# Patient Record
Sex: MALE | Race: WHITE | NOT HISPANIC OR LATINO | Employment: FULL TIME | URBAN - METROPOLITAN AREA
[De-identification: names, ages, dates, MRNs, and addresses within clinical notes are randomized per-mention and may not be internally consistent; named-entity substitution may affect disease eponyms.]

---

## 2017-02-15 ENCOUNTER — HISTORICAL (OUTPATIENT)
Dept: LAB | Facility: HOSPITAL | Age: 47
End: 2017-02-15

## 2018-02-14 ENCOUNTER — HISTORICAL (OUTPATIENT)
Dept: LAB | Facility: HOSPITAL | Age: 48
End: 2018-02-14

## 2018-02-14 LAB
ALBUMIN SERPL-MCNC: 3.9 GM/DL (ref 3.4–5)
ALP SERPL-CCNC: 73 UNIT/L (ref 46–116)
ALT SERPL-CCNC: 30 UNIT/L (ref 12–78)
AST SERPL-CCNC: 21 UNIT/L (ref 15–37)
BILIRUB SERPL-MCNC: 0.8 MG/DL (ref 0.2–1)
BILIRUBIN DIRECT+TOT PNL SERPL-MCNC: 0.2 MG/DL (ref 0–0.2)
BILIRUBIN DIRECT+TOT PNL SERPL-MCNC: 0.58 MG/DL (ref 0–0.8)
BUN SERPL-MCNC: 26.2 MG/DL (ref 7–18)
CALCIUM SERPL-MCNC: 10 MG/DL (ref 8.5–10.1)
CHLORIDE SERPL-SCNC: 105 MMOL/L (ref 98–107)
CHOLEST SERPL-MCNC: 146 MG/DL (ref 0–200)
CHOLEST/HDLC SERPL: 3 {RATIO} (ref 0–5)
CO2 SERPL-SCNC: 31 MMOL/L (ref 21–32)
CREAT SERPL-MCNC: 1.82 MG/DL (ref 0.6–1.3)
ERYTHROCYTE [DISTWIDTH] IN BLOOD BY AUTOMATED COUNT: 13.5 % (ref 11.5–17)
EST. AVERAGE GLUCOSE BLD GHB EST-MCNC: 114 MG/DL
FT4I SERPL CALC-MCNC: 2.26
GLUCOSE SERPL-MCNC: 111 MG/DL (ref 74–106)
HBA1C MFR BLD: 5.6 % (ref 4.5–6.2)
HCT VFR BLD AUTO: 47.3 % (ref 42–52)
HDLC SERPL-MCNC: 48 MG/DL (ref 40–60)
HGB BLD-MCNC: 15.9 GM/DL (ref 14–18)
LDLC SERPL CALC-MCNC: 67 MG/DL (ref 0–129)
MCH RBC QN AUTO: 32.8 PG (ref 27–31)
MCHC RBC AUTO-ENTMCNC: 33.7 GM/DL (ref 33–36)
MCV RBC AUTO: 97.3 FL (ref 80–94)
PLATELET # BLD AUTO: 375 X10(3)/MCL (ref 130–400)
PMV BLD AUTO: 8.3 FL (ref 7.4–10.4)
POTASSIUM SERPL-SCNC: 5.3 MMOL/L (ref 3.5–5.1)
PROT SERPL-MCNC: 7.5 GM/DL (ref 6.4–8.2)
PSA SERPL-MCNC: 0.63 NG/ML (ref 0–4)
RBC # BLD AUTO: 4.86 X10(6)/MCL (ref 4.7–6.1)
SODIUM SERPL-SCNC: 143 MMOL/L (ref 136–145)
T3RU NFR SERPL: 37 % (ref 31–39)
T4 SERPL-MCNC: 6.1 MCG/DL (ref 4.7–13.3)
TRIGL SERPL-MCNC: 157 MG/DL
TSH SERPL-ACNC: 2.58 MIU/ML (ref 0.36–3.74)
VLDLC SERPL CALC-MCNC: 31 MG/DL
WBC # SPEC AUTO: 7.8 X10(3)/MCL (ref 4.5–11.5)

## 2020-01-14 ENCOUNTER — HISTORICAL (OUTPATIENT)
Dept: LAB | Facility: HOSPITAL | Age: 50
End: 2020-01-14

## 2020-01-14 LAB
ABS NEUT (OLG): 4.21 X10(3)/MCL (ref 2.1–9.2)
ALBUMIN SERPL-MCNC: 4 GM/DL (ref 3.4–5)
ALBUMIN/GLOB SERPL: 1 RATIO (ref 1.1–2)
ALP SERPL-CCNC: 112 UNIT/L (ref 46–116)
ALT SERPL-CCNC: 30 UNIT/L (ref 12–78)
AST SERPL-CCNC: 27 UNIT/L (ref 15–37)
BASOPHILS # BLD AUTO: 0.1 X10(3)/MCL (ref 0–0.2)
BASOPHILS NFR BLD AUTO: 1 %
BILIRUB SERPL-MCNC: 0.5 MG/DL (ref 0.2–1)
BILIRUBIN DIRECT+TOT PNL SERPL-MCNC: 0.15 MG/DL (ref 0–0.2)
BILIRUBIN DIRECT+TOT PNL SERPL-MCNC: 0.35 MG/DL (ref 0–0.8)
BUN SERPL-MCNC: 25.1 MG/DL (ref 7–18)
CALCIUM SERPL-MCNC: 9.8 MG/DL (ref 8.5–10.1)
CHLORIDE SERPL-SCNC: 104 MMOL/L (ref 98–107)
CHOLEST SERPL-MCNC: 176 MG/DL (ref 0–200)
CHOLEST/HDLC SERPL: 4.1 {RATIO} (ref 0–5)
CO2 SERPL-SCNC: 30.8 MMOL/L (ref 21–32)
CREAT SERPL-MCNC: 1.81 MG/DL (ref 0.6–1.3)
EOSINOPHIL # BLD AUTO: 0.5 X10(3)/MCL (ref 0–0.9)
EOSINOPHIL NFR BLD AUTO: 7 %
ERYTHROCYTE [DISTWIDTH] IN BLOOD BY AUTOMATED COUNT: 14 % (ref 11.5–17)
EST. AVERAGE GLUCOSE BLD GHB EST-MCNC: 120 MG/DL
GLOBULIN SER-MCNC: 3.9 GM/DL (ref 2.4–3.5)
GLUCOSE SERPL-MCNC: 110 MG/DL (ref 74–106)
HBA1C MFR BLD: 5.8 % (ref 4.5–6.2)
HCT VFR BLD AUTO: 48.3 % (ref 42–52)
HDLC SERPL-MCNC: 43 MG/DL (ref 40–60)
HGB BLD-MCNC: 15.6 GM/DL (ref 14–18)
IMM GRANULOCYTES # BLD AUTO: 0.02 % (ref 0–0.02)
IMM GRANULOCYTES NFR BLD AUTO: 0.3 % (ref 0–0.43)
LDLC SERPL CALC-MCNC: 97 MG/DL (ref 0–129)
LYMPHOCYTES # BLD AUTO: 2 X10(3)/MCL (ref 0.6–4.6)
LYMPHOCYTES NFR BLD AUTO: 26 %
MCH RBC QN AUTO: 31.3 PG (ref 27–31)
MCHC RBC AUTO-ENTMCNC: 32.3 GM/DL (ref 33–36)
MCV RBC AUTO: 96.8 FL (ref 80–94)
MONOCYTES # BLD AUTO: 1 X10(3)/MCL (ref 0.1–1.3)
MONOCYTES NFR BLD AUTO: 13 %
NEUTROPHILS # BLD AUTO: 4.21 X10(3)/MCL (ref 1.4–7.9)
NEUTROPHILS NFR BLD AUTO: 54 %
PLATELET # BLD AUTO: 450 X10(3)/MCL (ref 130–400)
PMV BLD AUTO: 9.3 FL (ref 9.4–12.4)
POTASSIUM SERPL-SCNC: 5.5 MMOL/L (ref 3.5–5.1)
PROT SERPL-MCNC: 7.9 GM/DL (ref 6.4–8.2)
PSA SERPL-MCNC: 0.4 NG/ML (ref 0–4)
RBC # BLD AUTO: 4.99 X10(6)/MCL (ref 4.7–6.1)
SODIUM SERPL-SCNC: 143 MMOL/L (ref 136–145)
TESTOST SERPL-MCNC: 183 NG/DL (ref 241–827)
TRIGL SERPL-MCNC: 178 MG/DL
VLDLC SERPL CALC-MCNC: 36 MG/DL
WBC # SPEC AUTO: 7.8 X10(3)/MCL (ref 4.5–11.5)

## 2020-09-01 ENCOUNTER — HISTORICAL (OUTPATIENT)
Dept: LAB | Facility: HOSPITAL | Age: 50
End: 2020-09-01

## 2020-09-01 LAB
ALBUMIN SERPL-MCNC: 3.8 GM/DL (ref 3.4–5)
ALBUMIN/GLOB SERPL: 0.8 RATIO (ref 1.1–2)
ALP SERPL-CCNC: 106 UNIT/L (ref 46–116)
ALT SERPL-CCNC: 39 UNIT/L (ref 12–78)
AST SERPL-CCNC: 26 UNIT/L (ref 15–37)
BILIRUB SERPL-MCNC: 0.4 MG/DL (ref 0.2–1)
BILIRUBIN DIRECT+TOT PNL SERPL-MCNC: 0.16 MG/DL (ref 0–0.2)
BILIRUBIN DIRECT+TOT PNL SERPL-MCNC: 0.24 MG/DL (ref 0–0.8)
BUN SERPL-MCNC: 31.4 MG/DL (ref 7–18)
CALCIUM SERPL-MCNC: 10.2 MG/DL (ref 8.5–10.1)
CHLORIDE SERPL-SCNC: 106 MMOL/L (ref 98–107)
CHOLEST SERPL-MCNC: 124 MG/DL (ref 0–200)
CHOLEST/HDLC SERPL: 2.8 {RATIO} (ref 0–5)
CO2 SERPL-SCNC: 30.1 MMOL/L (ref 21–32)
CREAT SERPL-MCNC: 1.94 MG/DL (ref 0.6–1.3)
GLOBULIN SER-MCNC: 4.6 GM/DL (ref 2.4–3.5)
GLUCOSE SERPL-MCNC: 111 MG/DL (ref 74–106)
HDLC SERPL-MCNC: 45 MG/DL (ref 40–60)
LDLC SERPL CALC-MCNC: 54 MG/DL (ref 0–129)
POTASSIUM SERPL-SCNC: 5.1 MMOL/L (ref 3.5–5.1)
PROT SERPL-MCNC: 8.4 GM/DL (ref 6.4–8.2)
SODIUM SERPL-SCNC: 140 MMOL/L (ref 136–145)
TESTOST SERPL-MCNC: 157.09 NG/DL (ref 220.91–715.81)
TRIGL SERPL-MCNC: 126 MG/DL
VLDLC SERPL CALC-MCNC: 25 MG/DL

## 2021-10-01 ENCOUNTER — HISTORICAL (OUTPATIENT)
Dept: LAB | Facility: HOSPITAL | Age: 51
End: 2021-10-01

## 2021-10-01 LAB
ABS NEUT (OLG): 4.52 X10(3)/MCL (ref 2.1–9.2)
BASOPHILS # BLD AUTO: 0 X10(3)/MCL (ref 0–0.2)
BASOPHILS NFR BLD AUTO: 0 %
EOSINOPHIL # BLD AUTO: 0.5 X10(3)/MCL (ref 0–0.9)
EOSINOPHIL NFR BLD AUTO: 6 %
ERYTHROCYTE [DISTWIDTH] IN BLOOD BY AUTOMATED COUNT: 13.5 % (ref 11.5–17)
HCT VFR BLD AUTO: 44.8 % (ref 42–52)
HGB BLD-MCNC: 14.6 GM/DL (ref 14–18)
IMM GRANULOCYTES # BLD AUTO: 0.02 % (ref 0–0.02)
IMM GRANULOCYTES NFR BLD AUTO: 0.3 % (ref 0–0.43)
LYMPHOCYTES # BLD AUTO: 1.6 X10(3)/MCL (ref 0.6–4.6)
LYMPHOCYTES NFR BLD AUTO: 21 %
MCH RBC QN AUTO: 31.7 PG (ref 27–31)
MCHC RBC AUTO-ENTMCNC: 32.6 GM/DL (ref 33–36)
MCV RBC AUTO: 97.4 FL (ref 80–94)
MONOCYTES # BLD AUTO: 1 X10(3)/MCL (ref 0.1–1.3)
MONOCYTES NFR BLD AUTO: 13 %
NEUTROPHILS # BLD AUTO: 4.52 X10(3)/MCL (ref 1.4–7.9)
NEUTROPHILS NFR BLD AUTO: 59 %
PLATELET # BLD AUTO: 383 X10(3)/MCL (ref 130–400)
PMV BLD AUTO: 9.6 FL (ref 9.4–12.4)
RBC # BLD AUTO: 4.6 X10(6)/MCL (ref 4.7–6.1)
TESTOST SERPL-MCNC: 138.53 NG/DL (ref 220.91–715.81)
WBC # SPEC AUTO: 7.7 X10(3)/MCL (ref 4.5–11.5)

## 2022-03-10 ENCOUNTER — HISTORICAL (OUTPATIENT)
Dept: LAB | Facility: HOSPITAL | Age: 52
End: 2022-03-10

## 2022-03-10 LAB
ABS NEUT (OLG): 4.46 (ref 2.1–9.2)
ALBUMIN SERPL-MCNC: 4.1 G/DL (ref 3.5–5)
ALBUMIN/GLOB SERPL: 1.1 {RATIO} (ref 1.1–2)
ALP SERPL-CCNC: 98 U/L (ref 40–150)
ALT SERPL-CCNC: 20 U/L (ref 0–55)
AST SERPL-CCNC: 25 U/L (ref 5–34)
BASOPHILS # BLD AUTO: 0 10*3/UL (ref 0–0.2)
BASOPHILS NFR BLD AUTO: 0 %
BILIRUB SERPL-MCNC: 0.5 MG/DL
BILIRUBIN DIRECT+TOT PNL SERPL-MCNC: 0.2 (ref 0–0.5)
BILIRUBIN DIRECT+TOT PNL SERPL-MCNC: 0.3 (ref 0–0.8)
BUN SERPL-MCNC: 34.6 MG/DL (ref 8.4–25.7)
CALCIUM SERPL-MCNC: 9.9 MG/DL (ref 8.7–10.5)
CHLORIDE SERPL-SCNC: 103 MMOL/L (ref 98–107)
CHOLEST SERPL-MCNC: 139 MG/DL
CHOLEST/HDLC SERPL: 3 {RATIO} (ref 0–5)
CO2 SERPL-SCNC: 28 MMOL/L (ref 22–29)
CREAT SERPL-MCNC: 1.93 MG/DL (ref 0.73–1.18)
EOSINOPHIL # BLD AUTO: 0.5 10*3/UL (ref 0–0.9)
EOSINOPHIL NFR BLD AUTO: 6 %
ERYTHROCYTE [DISTWIDTH] IN BLOOD BY AUTOMATED COUNT: 14.9 % (ref 11.5–17)
FERRITIN SERPL-MCNC: 245.3 NG/ML (ref 21.81–274.66)
GLOBULIN SER-MCNC: 3.7 G/DL (ref 2.4–3.5)
GLUCOSE SERPL-MCNC: 99 MG/DL (ref 74–100)
HCT VFR BLD AUTO: 41.8 % (ref 42–52)
HDLC SERPL-MCNC: 41 MG/DL (ref 35–60)
HEMOLYSIS INTERF INDEX SERPL-ACNC: 4
HGB BLD-MCNC: 13.1 G/DL (ref 14–18)
ICTERIC INTERF INDEX SERPL-ACNC: 0
IMM GRANULOCYTES # BLD AUTO: 0.03 10*3/UL (ref 0–0.02)
IMM GRANULOCYTES NFR BLD AUTO: 0.4 % (ref 0–0.43)
IRON SATN MFR SERPL: 28 % (ref 20–50)
IRON SERPL-MCNC: 80 UG/DL (ref 65–175)
LDLC SERPL CALC-MCNC: 64 MG/DL (ref 50–140)
LIPEMIC INTERF INDEX SERPL-ACNC: 3
LYMPHOCYTES # BLD AUTO: 1.8 10*3/UL (ref 0.6–4.6)
LYMPHOCYTES NFR BLD AUTO: 23 %
MANUAL DIFF? (OHS): NO
MCH RBC QN AUTO: 30.9 PG (ref 27–31)
MCHC RBC AUTO-ENTMCNC: 31.3 G/DL (ref 33–36)
MCV RBC AUTO: 98.6 FL (ref 80–94)
MONOCYTES # BLD AUTO: 1.2 10*3/UL (ref 0.1–1.3)
MONOCYTES NFR BLD AUTO: 14 %
NEUTROPHILS # BLD AUTO: 4.46 10*3/UL (ref 1.4–7.9)
NEUTROPHILS NFR BLD AUTO: 56 %
PLATELET # BLD AUTO: 447 10*3/UL (ref 130–400)
PMV BLD AUTO: 9 FL (ref 9.4–12.4)
POTASSIUM SERPL-SCNC: 5.2 MMOL/L (ref 3.5–5.1)
PROT SERPL-MCNC: 7.8 G/DL (ref 6.4–8.3)
RBC # BLD AUTO: 4.24 10*6/UL (ref 4.7–6.1)
SODIUM SERPL-SCNC: 139 MMOL/L (ref 136–145)
TIBC SERPL-MCNC: 207 UG/DL (ref 69–240)
TIBC SERPL-MCNC: 287 UG/DL (ref 250–450)
TRANSFERRIN SERPL-MCNC: 252 MG/DL (ref 174–364)
TRIGL SERPL-MCNC: 168 MG/DL (ref 34–140)
VLDLC SERPL CALC-MCNC: 34 MG/DL
WBC # SPEC AUTO: 8 10*3/UL (ref 4.5–11.5)

## 2022-04-11 ENCOUNTER — HISTORICAL (OUTPATIENT)
Dept: LAB | Facility: HOSPITAL | Age: 52
End: 2022-04-11

## 2022-04-11 LAB
ABS NEUT (OLG): 6.18 (ref 2.1–9.2)
ALBUMIN SERPL-MCNC: 3.9 G/DL (ref 3.5–5)
ALP SERPL-CCNC: 105 U/L (ref 40–150)
ALT SERPL-CCNC: 24 U/L (ref 0–55)
AST SERPL-CCNC: 24 U/L (ref 5–34)
BASOPHILS # BLD AUTO: 0 10*3/UL (ref 0–0.2)
BASOPHILS NFR BLD AUTO: 0 %
BILIRUB SERPL-MCNC: 0.5 MG/DL
BILIRUBIN DIRECT+TOT PNL SERPL-MCNC: 0.2 (ref 0–0.5)
BILIRUBIN DIRECT+TOT PNL SERPL-MCNC: 0.3 (ref 0–0.8)
BUN SERPL-MCNC: 27.1 MG/DL (ref 8.4–25.7)
CALCIUM SERPL-MCNC: 9.8 MG/DL (ref 8.7–10.5)
CHLORIDE SERPL-SCNC: 103 MMOL/L (ref 98–107)
CO2 SERPL-SCNC: 28 MMOL/L (ref 22–29)
CREAT SERPL-MCNC: 1.64 MG/DL (ref 0.73–1.18)
CREAT/UREA NIT SERPL: 17
EOSINOPHIL # BLD AUTO: 0.3 10*3/UL (ref 0–0.9)
EOSINOPHIL NFR BLD AUTO: 3 %
ERYTHROCYTE [DISTWIDTH] IN BLOOD BY AUTOMATED COUNT: 13.5 % (ref 11.5–17)
FERRITIN SERPL-MCNC: 282.1 NG/ML (ref 21.81–274.66)
GLUCOSE SERPL-MCNC: 110 MG/DL (ref 74–100)
HCT VFR BLD AUTO: 39.9 % (ref 42–52)
HEMOLYSIS INTERF INDEX SERPL-ACNC: 6
HEMOLYSIS INTERF INDEX SERPL-ACNC: 6
HGB BLD-MCNC: 12.6 G/DL (ref 14–18)
ICTERIC INTERF INDEX SERPL-ACNC: 0
ICTERIC INTERF INDEX SERPL-ACNC: 0
IMM GRANULOCYTES # BLD AUTO: 0.01 10*3/UL (ref 0–0.02)
IMM GRANULOCYTES NFR BLD AUTO: 0.1 % (ref 0–0.43)
IRON SATN MFR SERPL: 35 % (ref 20–50)
IRON SERPL-MCNC: 88 UG/DL (ref 65–175)
LIPEMIC INTERF INDEX SERPL-ACNC: 3
LIPEMIC INTERF INDEX SERPL-ACNC: 3
LYMPHOCYTES # BLD AUTO: 1.3 10*3/UL (ref 0.6–4.6)
LYMPHOCYTES NFR BLD AUTO: 15 %
MANUAL DIFF? (OHS): NO
MCH RBC QN AUTO: 30.4 PG (ref 27–31)
MCHC RBC AUTO-ENTMCNC: 31.6 G/DL (ref 33–36)
MCV RBC AUTO: 96.4 FL (ref 80–94)
MONOCYTES # BLD AUTO: 1 10*3/UL (ref 0.1–1.3)
MONOCYTES NFR BLD AUTO: 11 %
NEUTROPHILS # BLD AUTO: 6.18 10*3/UL (ref 1.4–7.9)
NEUTROPHILS NFR BLD AUTO: 70 %
PLATELET # BLD AUTO: 450 10*3/UL (ref 130–400)
PMV BLD AUTO: 9.3 FL (ref 9.4–12.4)
POTASSIUM SERPL-SCNC: 4.9 MMOL/L (ref 3.5–5.1)
PROT SERPL-MCNC: 7.5 G/DL (ref 6.4–8.3)
RBC # BLD AUTO: 4.14 10*6/UL (ref 4.7–6.1)
SODIUM SERPL-SCNC: 143 MMOL/L (ref 136–145)
TIBC SERPL-MCNC: 162 UG/DL (ref 69–240)
TIBC SERPL-MCNC: 250 UG/DL (ref 250–450)
TRANSFERRIN SERPL-MCNC: 214 MG/DL (ref 174–364)
WBC # SPEC AUTO: 8.8 10*3/UL (ref 4.5–11.5)

## 2022-10-04 ENCOUNTER — LAB VISIT (OUTPATIENT)
Dept: LAB | Facility: HOSPITAL | Age: 52
End: 2022-10-04
Attending: FAMILY MEDICINE
Payer: COMMERCIAL

## 2022-10-04 DIAGNOSIS — I10 ESSENTIAL HYPERTENSION, MALIGNANT: ICD-10-CM

## 2022-10-04 DIAGNOSIS — Z00.00 ROUTINE GENERAL MEDICAL EXAMINATION AT A HEALTH CARE FACILITY: Primary | ICD-10-CM

## 2022-10-04 DIAGNOSIS — D64.9 ANEMIA, UNSPECIFIED TYPE: ICD-10-CM

## 2022-10-04 DIAGNOSIS — I25.10 CORONARY ATHEROSCLEROSIS OF NATIVE CORONARY ARTERY: ICD-10-CM

## 2022-10-04 DIAGNOSIS — Z79.899 ENCOUNTER FOR LONG-TERM (CURRENT) USE OF OTHER MEDICATIONS: ICD-10-CM

## 2022-10-04 LAB
ALBUMIN SERPL-MCNC: 3.8 GM/DL (ref 3.5–5)
ALP SERPL-CCNC: 91 UNIT/L (ref 40–150)
ALT SERPL-CCNC: 18 UNIT/L (ref 0–55)
ANION GAP SERPL CALC-SCNC: 10 MEQ/L
AST SERPL-CCNC: 21 UNIT/L (ref 5–34)
BASOPHILS # BLD AUTO: 0.03 X10(3)/MCL (ref 0–0.2)
BASOPHILS NFR BLD AUTO: 0.3 %
BILIRUBIN DIRECT+TOT PNL SERPL-MCNC: 0.2 MG/DL (ref 0–0.5)
BILIRUBIN DIRECT+TOT PNL SERPL-MCNC: 0.3 MG/DL (ref 0–0.8)
BILIRUBIN DIRECT+TOT PNL SERPL-MCNC: 0.5 MG/DL
BUN SERPL-MCNC: 29.8 MG/DL (ref 8.4–25.7)
CALCIUM SERPL-MCNC: 9.9 MG/DL (ref 8.4–10.2)
CHLORIDE SERPL-SCNC: 105 MMOL/L (ref 98–107)
CHOLEST SERPL-MCNC: 126 MG/DL
CHOLEST/HDLC SERPL: 3 {RATIO} (ref 0–5)
CO2 SERPL-SCNC: 27 MMOL/L (ref 22–29)
CREAT SERPL-MCNC: 1.93 MG/DL (ref 0.73–1.18)
CREAT/UREA NIT SERPL: 15
EOSINOPHIL # BLD AUTO: 0.45 X10(3)/MCL (ref 0–0.9)
EOSINOPHIL NFR BLD AUTO: 5.1 %
ERYTHROCYTE [DISTWIDTH] IN BLOOD BY AUTOMATED COUNT: 16.3 % (ref 11.5–17)
EST. AVERAGE GLUCOSE BLD GHB EST-MCNC: 108.3 MG/DL
FT4I SERPL CALC-MCNC: 2.48 (ref 2.6–3.6)
GFR SERPLBLD CREATININE-BSD FMLA CKD-EPI: 41 MLS/MIN/1.73/M2
GLUCOSE SERPL-MCNC: 105 MG/DL (ref 74–100)
HBA1C MFR BLD: 5.4 %
HCT VFR BLD AUTO: 38.5 % (ref 42–52)
HDLC SERPL-MCNC: 37 MG/DL (ref 35–60)
HGB BLD-MCNC: 12 GM/DL (ref 14–18)
IMM GRANULOCYTES # BLD AUTO: 0.02 X10(3)/MCL (ref 0–0.04)
IMM GRANULOCYTES NFR BLD AUTO: 0.2 %
IRON SERPL-MCNC: 59 UG/DL (ref 65–175)
LDLC SERPL CALC-MCNC: 58 MG/DL (ref 50–140)
LYMPHOCYTES # BLD AUTO: 1.28 X10(3)/MCL (ref 0.6–4.6)
LYMPHOCYTES NFR BLD AUTO: 14.6 %
MCH RBC QN AUTO: 30.5 PG (ref 27–31)
MCHC RBC AUTO-ENTMCNC: 31.2 MG/DL (ref 33–36)
MCV RBC AUTO: 98 FL (ref 80–94)
MONOCYTES # BLD AUTO: 0.93 X10(3)/MCL (ref 0.1–1.3)
MONOCYTES NFR BLD AUTO: 10.6 %
NEUTROPHILS # BLD AUTO: 6 X10(3)/MCL (ref 2.1–9.2)
NEUTROPHILS NFR BLD AUTO: 69.2 %
PLATELET # BLD AUTO: 476 X10(3)/MCL (ref 130–400)
PMV BLD AUTO: 9.2 FL (ref 7.4–10.4)
POTASSIUM SERPL-SCNC: 4.9 MMOL/L (ref 3.5–5.1)
PROT SERPL-MCNC: 7.4 GM/DL (ref 6.4–8.3)
PSA SERPL-MCNC: 0.28 NG/ML
RBC # BLD AUTO: 3.93 X10(6)/MCL (ref 4.7–6.1)
SODIUM SERPL-SCNC: 142 MMOL/L (ref 136–145)
T3RU NFR SERPL: 39.92 % (ref 31–39)
T4 SERPL-MCNC: 6.21 UG/DL (ref 4.87–11.72)
TESTOST SERPL-MCNC: 174.67 NG/DL (ref 220.91–715.81)
TRIGL SERPL-MCNC: 157 MG/DL (ref 34–140)
TSH SERPL-ACNC: 1.74 UIU/ML (ref 0.35–4.94)
VLDLC SERPL CALC-MCNC: 31 MG/DL
WBC # SPEC AUTO: 8.7 X10(3)/MCL (ref 4.5–11.5)

## 2022-10-04 PROCEDURE — 83540 ASSAY OF IRON: CPT

## 2022-10-04 PROCEDURE — 80048 BASIC METABOLIC PNL TOTAL CA: CPT

## 2022-10-04 PROCEDURE — 80061 LIPID PANEL: CPT

## 2022-10-04 PROCEDURE — 84153 ASSAY OF PSA TOTAL: CPT

## 2022-10-04 PROCEDURE — 84443 ASSAY THYROID STIM HORMONE: CPT

## 2022-10-04 PROCEDURE — 36415 COLL VENOUS BLD VENIPUNCTURE: CPT

## 2022-10-04 PROCEDURE — 84403 ASSAY OF TOTAL TESTOSTERONE: CPT

## 2022-10-04 PROCEDURE — 85025 COMPLETE CBC W/AUTO DIFF WBC: CPT

## 2022-10-04 PROCEDURE — 80076 HEPATIC FUNCTION PANEL: CPT

## 2022-10-04 PROCEDURE — 83036 HEMOGLOBIN GLYCOSYLATED A1C: CPT

## 2022-10-04 PROCEDURE — 84436 ASSAY OF TOTAL THYROXINE: CPT

## 2024-05-04 ENCOUNTER — LAB VISIT (OUTPATIENT)
Dept: LAB | Facility: HOSPITAL | Age: 54
End: 2024-05-04
Attending: FAMILY MEDICINE
Payer: COMMERCIAL

## 2024-05-04 DIAGNOSIS — Q85.83 VON HIPPEL-LINDAU SYNDROME: ICD-10-CM

## 2024-05-04 DIAGNOSIS — G47.33 OBSTRUCTIVE SLEEP APNEA: Primary | ICD-10-CM

## 2024-05-04 DIAGNOSIS — I10 HYPERTENSION, UNSPECIFIED TYPE: ICD-10-CM

## 2024-05-04 DIAGNOSIS — I25.10 CORONARY ARTERY DISEASE, UNSPECIFIED VESSEL OR LESION TYPE, UNSPECIFIED WHETHER ANGINA PRESENT, UNSPECIFIED WHETHER NATIVE OR TRANSPLANTED HEART: ICD-10-CM

## 2024-05-04 DIAGNOSIS — D64.9 ANEMIA, UNSPECIFIED TYPE: ICD-10-CM

## 2024-05-04 DIAGNOSIS — E78.5 HYPERLIPIDEMIA, UNSPECIFIED HYPERLIPIDEMIA TYPE: ICD-10-CM

## 2024-05-04 DIAGNOSIS — N28.9 RENAL INSUFFICIENCY: ICD-10-CM

## 2024-05-04 DIAGNOSIS — R53.83 FATIGUE, UNSPECIFIED TYPE: ICD-10-CM

## 2024-05-04 LAB
ALBUMIN SERPL-MCNC: 2.9 G/DL (ref 3.5–5)
ALP SERPL-CCNC: 90 UNIT/L (ref 40–150)
ALT SERPL-CCNC: 42 UNIT/L (ref 0–55)
ANION GAP SERPL CALC-SCNC: 11 MEQ/L
AST SERPL-CCNC: 31 UNIT/L (ref 5–34)
BASOPHILS # BLD AUTO: 0.03 X10(3)/MCL
BASOPHILS NFR BLD AUTO: 0.3 %
BILIRUB SERPL-MCNC: 0.2 MG/DL
BILIRUBIN DIRECT+TOT PNL SERPL-MCNC: 0.1 MG/DL (ref 0–0.8)
BILIRUBIN DIRECT+TOT PNL SERPL-MCNC: 0.1 MG/DL (ref 0–?)
BUN SERPL-MCNC: 48.9 MG/DL (ref 8.4–25.7)
CALCIUM SERPL-MCNC: 9.2 MG/DL (ref 8.4–10.2)
CHLORIDE SERPL-SCNC: 109 MMOL/L (ref 98–107)
CHOLEST SERPL-MCNC: 112 MG/DL
CHOLEST/HDLC SERPL: 4 {RATIO} (ref 0–5)
CO2 SERPL-SCNC: 25 MMOL/L (ref 22–29)
CREAT SERPL-MCNC: 2.55 MG/DL (ref 0.73–1.18)
CREAT/UREA NIT SERPL: 19
EOSINOPHIL # BLD AUTO: 0.59 X10(3)/MCL (ref 0–0.9)
EOSINOPHIL NFR BLD AUTO: 5.6 %
ERYTHROCYTE [DISTWIDTH] IN BLOOD BY AUTOMATED COUNT: 14.5 % (ref 11.5–17)
EST. AVERAGE GLUCOSE BLD GHB EST-MCNC: 114 MG/DL
FT4I SERPL CALC-MCNC: 2.72 (ref 2.6–3.6)
GFR SERPLBLD CREATININE-BSD FMLA CKD-EPI: 29 MLS/MIN/1.73/M2
GLUCOSE SERPL-MCNC: 108 MG/DL (ref 74–100)
HBA1C MFR BLD: 5.6 %
HCT VFR BLD AUTO: 37.2 % (ref 42–52)
HDLC SERPL-MCNC: 30 MG/DL (ref 35–60)
HGB BLD-MCNC: 11.9 G/DL (ref 14–18)
IMM GRANULOCYTES # BLD AUTO: 0.21 X10(3)/MCL (ref 0–0.04)
IMM GRANULOCYTES NFR BLD AUTO: 2 %
IRON SERPL-MCNC: 32 UG/DL (ref 65–175)
LDLC SERPL CALC-MCNC: 62 MG/DL (ref 50–140)
LYMPHOCYTES # BLD AUTO: 1.9 X10(3)/MCL (ref 0.6–4.6)
LYMPHOCYTES NFR BLD AUTO: 18 %
MCH RBC QN AUTO: 30.8 PG (ref 27–31)
MCHC RBC AUTO-ENTMCNC: 32 G/DL (ref 33–36)
MCV RBC AUTO: 96.4 FL (ref 80–94)
MONOCYTES # BLD AUTO: 1.06 X10(3)/MCL (ref 0.1–1.3)
MONOCYTES NFR BLD AUTO: 10 %
NEUTROPHILS # BLD AUTO: 6.76 X10(3)/MCL (ref 2.1–9.2)
NEUTROPHILS NFR BLD AUTO: 64.1 %
PLATELET # BLD AUTO: 658 X10(3)/MCL (ref 130–400)
PMV BLD AUTO: 9.4 FL (ref 7.4–10.4)
POTASSIUM SERPL-SCNC: 5.7 MMOL/L (ref 3.5–5.1)
PROT SERPL-MCNC: 7.1 GM/DL (ref 6.4–8.3)
PSA SERPL-MCNC: 0.31 NG/ML
RBC # BLD AUTO: 3.86 X10(6)/MCL (ref 4.7–6.1)
SODIUM SERPL-SCNC: 145 MMOL/L (ref 136–145)
T3RU NFR SERPL: 38.4 % (ref 31–39)
T4 SERPL-MCNC: 7.08 UG/DL (ref 4.87–11.72)
TESTOST SERPL-MCNC: 98.1 NG/DL (ref 220.91–715.81)
TRIGL SERPL-MCNC: 99 MG/DL (ref 34–140)
TSH SERPL-ACNC: 2.84 UIU/ML (ref 0.35–4.94)
VLDLC SERPL CALC-MCNC: 20 MG/DL
WBC # SPEC AUTO: 10.55 X10(3)/MCL (ref 4.5–11.5)

## 2024-05-04 PROCEDURE — 84403 ASSAY OF TOTAL TESTOSTERONE: CPT

## 2024-05-04 PROCEDURE — 84436 ASSAY OF TOTAL THYROXINE: CPT

## 2024-05-04 PROCEDURE — 80076 HEPATIC FUNCTION PANEL: CPT

## 2024-05-04 PROCEDURE — 83036 HEMOGLOBIN GLYCOSYLATED A1C: CPT

## 2024-05-04 PROCEDURE — 80048 BASIC METABOLIC PNL TOTAL CA: CPT

## 2024-05-04 PROCEDURE — 84153 ASSAY OF PSA TOTAL: CPT

## 2024-05-04 PROCEDURE — 80061 LIPID PANEL: CPT

## 2024-05-04 PROCEDURE — 83540 ASSAY OF IRON: CPT

## 2024-05-04 PROCEDURE — 84443 ASSAY THYROID STIM HORMONE: CPT

## 2024-05-04 PROCEDURE — 85025 COMPLETE CBC W/AUTO DIFF WBC: CPT

## 2024-05-04 PROCEDURE — 36415 COLL VENOUS BLD VENIPUNCTURE: CPT

## 2024-11-06 ENCOUNTER — LAB VISIT (OUTPATIENT)
Dept: LAB | Facility: HOSPITAL | Age: 54
End: 2024-11-06
Attending: INTERNAL MEDICINE
Payer: COMMERCIAL

## 2024-11-06 DIAGNOSIS — E03.9 MYXEDEMA HEART DISEASE: ICD-10-CM

## 2024-11-06 DIAGNOSIS — E55.9 AVITAMINOSIS D: Primary | ICD-10-CM

## 2024-11-06 DIAGNOSIS — R73.9 BLOOD GLUCOSE ELEVATED: ICD-10-CM

## 2024-11-06 DIAGNOSIS — N18.4 CHRONIC KIDNEY DISEASE, STAGE IV (SEVERE): ICD-10-CM

## 2024-11-06 DIAGNOSIS — I25.10 CORONARY ATHEROSCLEROSIS OF NATIVE CORONARY ARTERY: ICD-10-CM

## 2024-11-06 DIAGNOSIS — I12.9 PARENCHYMAL RENAL HYPERTENSION: ICD-10-CM

## 2024-11-06 DIAGNOSIS — E78.5 HYPERLIPIDEMIA, UNSPECIFIED HYPERLIPIDEMIA TYPE: ICD-10-CM

## 2024-11-06 DIAGNOSIS — I51.9 MYXEDEMA HEART DISEASE: ICD-10-CM

## 2024-11-06 DIAGNOSIS — D63.1 ANEMIA OF CHRONIC RENAL FAILURE, UNSPECIFIED CKD STAGE: ICD-10-CM

## 2024-11-06 DIAGNOSIS — N18.9 ANEMIA OF CHRONIC RENAL FAILURE, UNSPECIFIED CKD STAGE: ICD-10-CM

## 2024-11-06 LAB
25(OH)D3+25(OH)D2 SERPL-MCNC: 30 NG/ML (ref 30–80)
ALBUMIN SERPL-MCNC: 3.6 G/DL (ref 3.5–5)
ALBUMIN/GLOB SERPL: 1.1 RATIO (ref 1.1–2)
ALP SERPL-CCNC: 100 UNIT/L (ref 40–150)
ALT SERPL-CCNC: 56 UNIT/L (ref 0–55)
ANION GAP SERPL CALC-SCNC: 5 MEQ/L
AST SERPL-CCNC: 53 UNIT/L (ref 5–34)
BACTERIA #/AREA URNS AUTO: ABNORMAL /HPF
BILIRUB SERPL-MCNC: 0.4 MG/DL
BILIRUB UR QL STRIP.AUTO: NEGATIVE
BUN SERPL-MCNC: 28.4 MG/DL (ref 8.4–25.7)
CALCIUM SERPL-MCNC: 9.5 MG/DL (ref 8.4–10.2)
CHLORIDE SERPL-SCNC: 106 MMOL/L (ref 98–107)
CK SERPL-CCNC: 312 U/L (ref 30–200)
CLARITY UR: CLEAR
CO2 SERPL-SCNC: 30 MMOL/L (ref 22–29)
COLOR UR AUTO: ABNORMAL
CREAT SERPL-MCNC: 2.76 MG/DL (ref 0.72–1.25)
CREAT UR-MCNC: 90.1 MG/DL (ref 63–166)
CREAT/UREA NIT SERPL: 10
ERYTHROCYTE [DISTWIDTH] IN BLOOD BY AUTOMATED COUNT: 17.2 % (ref 11.5–17)
EST. AVERAGE GLUCOSE BLD GHB EST-MCNC: 114 MG/DL
GFR SERPLBLD CREATININE-BSD FMLA CKD-EPI: 26 ML/MIN/1.73/M2
GLOBULIN SER-MCNC: 3.2 GM/DL (ref 2.4–3.5)
GLUCOSE SERPL-MCNC: 99 MG/DL (ref 74–100)
GLUCOSE UR QL STRIP: NEGATIVE
HBA1C MFR BLD: 5.6 %
HCT VFR BLD AUTO: 46.5 % (ref 42–52)
HGB BLD-MCNC: 14.8 G/DL (ref 14–18)
HGB UR QL STRIP: ABNORMAL
KETONES UR QL STRIP: NEGATIVE
LEUKOCYTE ESTERASE UR QL STRIP: ABNORMAL
MCH RBC QN AUTO: 31.1 PG (ref 27–31)
MCHC RBC AUTO-ENTMCNC: 31.8 G/DL (ref 33–36)
MCV RBC AUTO: 97.7 FL (ref 80–94)
NITRITE UR QL STRIP: NEGATIVE
PH UR STRIP: 6 [PH]
PLATELET # BLD AUTO: 410 X10(3)/MCL (ref 130–400)
PMV BLD AUTO: 9.5 FL (ref 7.4–10.4)
POTASSIUM SERPL-SCNC: 5.2 MMOL/L (ref 3.5–5.1)
PROT SERPL-MCNC: 6.8 GM/DL (ref 6.4–8.3)
PROT UR QL STRIP: ABNORMAL
PROT UR STRIP-MCNC: 26.4 MG/DL
PTH-INTACT SERPL-MCNC: 270.5 PG/ML (ref 8.7–77)
RBC # BLD AUTO: 4.76 X10(6)/MCL (ref 4.7–6.1)
RBC #/AREA URNS AUTO: ABNORMAL /HPF
SODIUM SERPL-SCNC: 141 MMOL/L (ref 136–145)
SP GR UR STRIP.AUTO: 1.01 (ref 1–1.03)
SQUAMOUS #/AREA URNS AUTO: ABNORMAL /HPF
URATE SERPL-MCNC: 8.3 MG/DL (ref 3.5–7.2)
URINE PROTEIN/CREATININE RATIO (OLG): 0.3
UROBILINOGEN UR STRIP-ACNC: 0.2
WBC # BLD AUTO: 7.75 X10(3)/MCL (ref 4.5–11.5)
WBC #/AREA URNS AUTO: ABNORMAL /HPF

## 2024-11-06 PROCEDURE — 83970 ASSAY OF PARATHORMONE: CPT

## 2024-11-06 PROCEDURE — 82550 ASSAY OF CK (CPK): CPT

## 2024-11-06 PROCEDURE — 80053 COMPREHEN METABOLIC PANEL: CPT

## 2024-11-06 PROCEDURE — 82306 VITAMIN D 25 HYDROXY: CPT

## 2024-11-06 PROCEDURE — 81003 URINALYSIS AUTO W/O SCOPE: CPT

## 2024-11-06 PROCEDURE — 36415 COLL VENOUS BLD VENIPUNCTURE: CPT

## 2024-11-06 PROCEDURE — 85027 COMPLETE CBC AUTOMATED: CPT

## 2024-11-06 PROCEDURE — 84550 ASSAY OF BLOOD/URIC ACID: CPT

## 2024-11-06 PROCEDURE — 83036 HEMOGLOBIN GLYCOSYLATED A1C: CPT

## 2024-11-06 PROCEDURE — 84156 ASSAY OF PROTEIN URINE: CPT

## 2025-01-27 ENCOUNTER — LAB VISIT (OUTPATIENT)
Dept: LAB | Facility: HOSPITAL | Age: 55
End: 2025-01-27
Attending: INTERNAL MEDICINE
Payer: COMMERCIAL

## 2025-01-27 DIAGNOSIS — C79.00: ICD-10-CM

## 2025-01-27 DIAGNOSIS — C64.1 MALIGNANT NEOPLASM OF RIGHT KIDNEY, EXCEPT RENAL PELVIS: ICD-10-CM

## 2025-01-27 DIAGNOSIS — D63.0 ANEMIA IN NEOPLASTIC DISEASE: ICD-10-CM

## 2025-01-27 DIAGNOSIS — E78.2 MIXED HYPERLIPIDEMIA: ICD-10-CM

## 2025-01-27 DIAGNOSIS — C64.9 RENAL CELL CARCINOMA, UNSPECIFIED LATERALITY: Primary | ICD-10-CM

## 2025-01-27 DIAGNOSIS — E87.5 HYPERPOTASSEMIA: ICD-10-CM

## 2025-01-27 DIAGNOSIS — I25.10 CORONARY ATHEROSCLEROSIS OF NATIVE CORONARY ARTERY: ICD-10-CM

## 2025-01-27 DIAGNOSIS — C64.2 MALIGNANT NEOPLASM OF LEFT KIDNEY, EXCEPT RENAL PELVIS: ICD-10-CM

## 2025-01-27 DIAGNOSIS — I10 ESSENTIAL HYPERTENSION, MALIGNANT: ICD-10-CM

## 2025-01-27 DIAGNOSIS — Q85.83 VON HIPPEL-LINDAU SYNDROME: ICD-10-CM

## 2025-01-27 LAB
ALBUMIN SERPL-MCNC: 2.7 G/DL (ref 3.5–5)
ALBUMIN/GLOB SERPL: 0.7 RATIO (ref 1.1–2)
ALP SERPL-CCNC: 87 UNIT/L (ref 40–150)
ALT SERPL-CCNC: 13 UNIT/L (ref 0–55)
ANION GAP SERPL CALC-SCNC: 8 MEQ/L
AST SERPL-CCNC: 19 UNIT/L (ref 5–34)
BASOPHILS # BLD AUTO: 0.03 X10(3)/MCL
BASOPHILS NFR BLD AUTO: 0.5 %
BILIRUB SERPL-MCNC: 0.6 MG/DL
BUN SERPL-MCNC: 37.6 MG/DL (ref 8.4–25.7)
CALCIUM SERPL-MCNC: 9.1 MG/DL (ref 8.4–10.2)
CHLORIDE SERPL-SCNC: 111 MMOL/L (ref 98–107)
CHOLEST SERPL-MCNC: 163 MG/DL
CHOLEST/HDLC SERPL: 5 {RATIO} (ref 0–5)
CO2 SERPL-SCNC: 26 MMOL/L (ref 22–29)
CREAT SERPL-MCNC: 2.23 MG/DL (ref 0.72–1.25)
CREAT/UREA NIT SERPL: 17
EOSINOPHIL # BLD AUTO: 0.38 X10(3)/MCL (ref 0–0.9)
EOSINOPHIL NFR BLD AUTO: 5.8 %
ERYTHROCYTE [DISTWIDTH] IN BLOOD BY AUTOMATED COUNT: 17.8 % (ref 11.5–17)
GFR SERPLBLD CREATININE-BSD FMLA CKD-EPI: 34 ML/MIN/1.73/M2
GLOBULIN SER-MCNC: 4 GM/DL (ref 2.4–3.5)
GLUCOSE SERPL-MCNC: 100 MG/DL (ref 74–100)
HCT VFR BLD AUTO: 39.3 % (ref 42–52)
HDLC SERPL-MCNC: 36 MG/DL (ref 35–60)
HGB BLD-MCNC: 12.6 G/DL (ref 14–18)
IMM GRANULOCYTES # BLD AUTO: 0.08 X10(3)/MCL (ref 0–0.04)
IMM GRANULOCYTES NFR BLD AUTO: 1.2 %
LDLC SERPL CALC-MCNC: 96 MG/DL (ref 50–140)
LYMPHOCYTES # BLD AUTO: 1.42 X10(3)/MCL (ref 0.6–4.6)
LYMPHOCYTES NFR BLD AUTO: 21.7 %
MCH RBC QN AUTO: 32.7 PG (ref 27–31)
MCHC RBC AUTO-ENTMCNC: 32.1 G/DL (ref 33–36)
MCV RBC AUTO: 102.1 FL (ref 80–94)
MONOCYTES # BLD AUTO: 1.01 X10(3)/MCL (ref 0.1–1.3)
MONOCYTES NFR BLD AUTO: 15.4 %
NEUTROPHILS # BLD AUTO: 3.62 X10(3)/MCL (ref 2.1–9.2)
NEUTROPHILS NFR BLD AUTO: 55.4 %
PLATELET # BLD AUTO: 498 X10(3)/MCL (ref 130–400)
PMV BLD AUTO: 10.8 FL (ref 7.4–10.4)
POTASSIUM SERPL-SCNC: 5 MMOL/L (ref 3.5–5.1)
PROT SERPL-MCNC: 6.7 GM/DL (ref 6.4–8.3)
RBC # BLD AUTO: 3.85 X10(6)/MCL (ref 4.7–6.1)
SODIUM SERPL-SCNC: 145 MMOL/L (ref 136–145)
TRIGL SERPL-MCNC: 154 MG/DL (ref 34–140)
VLDLC SERPL CALC-MCNC: 31 MG/DL
WBC # BLD AUTO: 6.54 X10(3)/MCL (ref 4.5–11.5)

## 2025-01-27 PROCEDURE — 80053 COMPREHEN METABOLIC PANEL: CPT

## 2025-01-27 PROCEDURE — 85025 COMPLETE CBC W/AUTO DIFF WBC: CPT

## 2025-01-27 PROCEDURE — 80061 LIPID PANEL: CPT

## 2025-01-27 PROCEDURE — 36415 COLL VENOUS BLD VENIPUNCTURE: CPT

## 2025-06-06 ENCOUNTER — HOSPITAL ENCOUNTER (INPATIENT)
Facility: HOSPITAL | Age: 55
LOS: 6 days | Discharge: HOME | DRG: 280 | End: 2025-06-12
Attending: EMERGENCY MEDICINE | Admitting: INTERNAL MEDICINE
Payer: COMMERCIAL

## 2025-06-06 DIAGNOSIS — R53.1 GENERALIZED WEAKNESS: ICD-10-CM

## 2025-06-06 DIAGNOSIS — N18.32 STAGE 3B CHRONIC KIDNEY DISEASE: ICD-10-CM

## 2025-06-06 DIAGNOSIS — R07.9 ACUTE CHEST PAIN: Primary | ICD-10-CM

## 2025-06-06 DIAGNOSIS — R07.9 CHEST PAIN: ICD-10-CM

## 2025-06-06 DIAGNOSIS — I21.3 ST ELEVATION MYOCARDIAL INFARCTION (STEMI), UNSPECIFIED ARTERY: ICD-10-CM

## 2025-06-06 DIAGNOSIS — I95.9 HYPOTENSION, UNSPECIFIED HYPOTENSION TYPE: ICD-10-CM

## 2025-06-06 DIAGNOSIS — I95.9 HYPOTENSION: ICD-10-CM

## 2025-06-06 DIAGNOSIS — R06.02 SHORTNESS OF BREATH: ICD-10-CM

## 2025-06-06 DIAGNOSIS — R53.1 WEAKNESS: ICD-10-CM

## 2025-06-06 LAB
ABS NEUT (OLG): 6.57 X10(3)/MCL (ref 2.1–9.2)
ALBUMIN SERPL-MCNC: 2.4 G/DL (ref 3.5–5)
ALBUMIN/GLOB SERPL: 0.5 RATIO (ref 1.1–2)
ALP SERPL-CCNC: 63 UNIT/L (ref 40–150)
ALT SERPL-CCNC: 18 UNIT/L (ref 0–55)
ANION GAP SERPL CALC-SCNC: 10 MEQ/L
AORTIC ROOT ANNULUS: 3.6 CM
AORTIC VALVE CUSP SEPERATION: 2.4 CM
APICAL FOUR CHAMBER EJECTION FRACTION: 60 %
AST SERPL-CCNC: 41 UNIT/L (ref 11–45)
AV INDEX (PROSTH): 0.71
AV MEAN GRADIENT: 2 MMHG
AV PEAK GRADIENT: 4 MMHG
AV VALVE AREA BY VELOCITY RATIO: 2.2 CM²
AV VALVE AREA: 2.2 CM²
AV VELOCITY RATIO: 0.7
BASOPHILS NFR BLD MANUAL: 0.21 X10(3)/MCL (ref 0–0.2)
BASOPHILS NFR BLD MANUAL: 2 %
BILIRUB SERPL-MCNC: 0.5 MG/DL
BUN SERPL-MCNC: 38.6 MG/DL (ref 8.4–25.7)
CALCIUM SERPL-MCNC: 9.3 MG/DL (ref 8.4–10.2)
CHLORIDE SERPL-SCNC: 106 MMOL/L (ref 98–107)
CO2 SERPL-SCNC: 25 MMOL/L (ref 22–29)
CREAT SERPL-MCNC: 2.25 MG/DL (ref 0.72–1.25)
CREAT/UREA NIT SERPL: 17
CV ECHO LV RWT: 0.58 CM
DOP CALC AO PEAK VEL: 1 M/S
DOP CALC AO VTI: 15 CM
DOP CALC LVOT AREA: 3.1 CM2
DOP CALC LVOT DIAMETER: 2 CM
DOP CALC LVOT PEAK VEL: 0.7 M/S
DOP CALC LVOT STROKE VOLUME: 33.6 CM3
DOP CALC MV VTI: 16.9 CM
DOP CALCLVOT PEAK VEL VTI: 10.7 CM
E WAVE DECELERATION TIME: 166 MSEC
E/A RATIO: 1.45
E/E' RATIO: 9 M/S
ECHO LV POSTERIOR WALL: 1.3 CM (ref 0.6–1.1)
EOSINOPHIL NFR BLD MANUAL: 0.92 X10(3)/MCL (ref 0–0.9)
EOSINOPHIL NFR BLD MANUAL: 9 %
ERYTHROCYTE [DISTWIDTH] IN BLOOD BY AUTOMATED COUNT: 13.9 % (ref 11.5–17)
FRACTIONAL SHORTENING: 31.1 % (ref 28–44)
GFR SERPLBLD CREATININE-BSD FMLA CKD-EPI: 34 ML/MIN/1.73/M2
GLOBULIN SER-MCNC: 4.6 GM/DL (ref 2.4–3.5)
GLUCOSE SERPL-MCNC: 105 MG/DL (ref 74–100)
HCT VFR BLD AUTO: 37.6 % (ref 42–52)
HGB BLD-MCNC: 12.4 G/DL (ref 14–18)
INSTRUMENT WBC (OLG): 10.26 X10(3)/MCL
INTERVENTRICULAR SEPTUM: 1.1 CM (ref 0.6–1.1)
LACTATE SERPL-SCNC: 0.9 MMOL/L (ref 0.5–2.2)
LEFT INTERNAL DIMENSION IN SYSTOLE: 3.1 CM (ref 2.1–4)
LEFT VENTRICLE DIASTOLIC VOLUME: 92 ML
LEFT VENTRICLE END DIASTOLIC VOLUME APICAL 4 CHAMBER: 114 ML
LEFT VENTRICLE SYSTOLIC VOLUME: 38 ML
LEFT VENTRICULAR INTERNAL DIMENSION IN DIASTOLE: 4.5 CM (ref 3.5–6)
LEFT VENTRICULAR MASS: 198.1 G
LV LATERAL E/E' RATIO: 8.7 M/S
LV SEPTAL E/E' RATIO: 8.7 M/S
LVED V (TEICH): 92.4 ML
LVES V (TEICH): 37.9 ML
LVOT MG: 1 MMHG
LVOT MV: 0.44 CM/S
LYMPHOCYTES NFR BLD MANUAL: 2.05 X10(3)/MCL (ref 0.6–4.6)
LYMPHOCYTES NFR BLD MANUAL: 20 %
MCH RBC QN AUTO: 34.6 PG (ref 27–31)
MCHC RBC AUTO-ENTMCNC: 33 G/DL (ref 33–36)
MCV RBC AUTO: 105 FL (ref 80–94)
MONOCYTES NFR BLD MANUAL: 0.51 X10(3)/MCL (ref 0.1–1.3)
MONOCYTES NFR BLD MANUAL: 5 %
MV MEAN GRADIENT: 1 MMHG
MV PEAK A VEL: 0.42 M/S
MV PEAK E VEL: 0.61 M/S
MV PEAK GRADIENT: 3 MMHG
MV STENOSIS PRESSURE HALF TIME: 46 MS
MV VALVE AREA BY CONTINUITY EQUATION: 1.99 CM2
MV VALVE AREA P 1/2 METHOD: 4.78 CM2
NEUTROPHILS NFR BLD MANUAL: 64 %
PISA TR MAX VEL: 2.5 M/S
PLATELET # BLD AUTO: 503 X10(3)/MCL (ref 130–400)
PLATELET # BLD EST: ABNORMAL 10*3/UL
PMV BLD AUTO: 10.2 FL (ref 7.4–10.4)
POTASSIUM SERPL-SCNC: 4.3 MMOL/L (ref 3.5–5.1)
PROT SERPL-MCNC: 7 GM/DL (ref 6.4–8.3)
PV PEAK GRADIENT: 3 MMHG
PV PEAK VELOCITY: 0.91 M/S
RBC # BLD AUTO: 3.58 X10(6)/MCL (ref 4.7–6.1)
RBC MORPH BLD: NORMAL
RIGHT VENTRICLE DIASTOLIC MID DIMENSION: 2.6 CM
RV MID DIAMA: 2.6 CM
RV TISSUE DOPPLER FREE WALL SYSTOLIC VELOCITY 1 (APICAL 4 CHAMBER VIEW): 12.1 CM/S
SODIUM SERPL-SCNC: 141 MMOL/L (ref 136–145)
TDI LATERAL: 0.07 M/S
TDI SEPTAL: 0.07 M/S
TDI: 0.07 M/S
TR MAX PG: 25 MMHG
TRICUSPID ANNULAR PLANE SYSTOLIC EXCURSION: 2.1 CM
TROPONIN I SERPL-MCNC: 2.25 NG/ML (ref 0–0.04)
TROPONIN I SERPL-MCNC: 2.4 NG/ML (ref 0–0.04)
WBC # BLD AUTO: 10.26 X10(3)/MCL (ref 4.5–11.5)

## 2025-06-06 PROCEDURE — 87040 BLOOD CULTURE FOR BACTERIA: CPT

## 2025-06-06 PROCEDURE — 93010 ELECTROCARDIOGRAM REPORT: CPT | Mod: ,,, | Performed by: INTERNAL MEDICINE

## 2025-06-06 PROCEDURE — 25000003 PHARM REV CODE 250: Performed by: INTERNAL MEDICINE

## 2025-06-06 PROCEDURE — 96374 THER/PROPH/DIAG INJ IV PUSH: CPT

## 2025-06-06 PROCEDURE — 84484 ASSAY OF TROPONIN QUANT: CPT | Performed by: INTERNAL MEDICINE

## 2025-06-06 PROCEDURE — 36415 COLL VENOUS BLD VENIPUNCTURE: CPT

## 2025-06-06 PROCEDURE — B241ZZ3 ULTRASONOGRAPHY OF MULTIPLE CORONARY ARTERIES, INTRAVASCULAR: ICD-10-PCS | Performed by: INTERNAL MEDICINE

## 2025-06-06 PROCEDURE — 99291 CRITICAL CARE FIRST HOUR: CPT

## 2025-06-06 PROCEDURE — 83605 ASSAY OF LACTIC ACID: CPT

## 2025-06-06 PROCEDURE — 92978 ENDOLUMINL IVUS OCT C 1ST: CPT | Mod: LD | Performed by: INTERNAL MEDICINE

## 2025-06-06 PROCEDURE — 63600175 PHARM REV CODE 636 W HCPCS

## 2025-06-06 PROCEDURE — C1894 INTRO/SHEATH, NON-LASER: HCPCS | Performed by: INTERNAL MEDICINE

## 2025-06-06 PROCEDURE — 92979 ENDOLUMINL IVUS OCT C EA: CPT | Mod: RC,LC | Performed by: INTERNAL MEDICINE

## 2025-06-06 PROCEDURE — 99900035 HC TECH TIME PER 15 MIN (STAT)

## 2025-06-06 PROCEDURE — 85025 COMPLETE CBC W/AUTO DIFF WBC: CPT

## 2025-06-06 PROCEDURE — 99152 MOD SED SAME PHYS/QHP 5/>YRS: CPT | Performed by: INTERNAL MEDICINE

## 2025-06-06 PROCEDURE — 80053 COMPREHEN METABOLIC PANEL: CPT

## 2025-06-06 PROCEDURE — 11000001 HC ACUTE MED/SURG PRIVATE ROOM

## 2025-06-06 PROCEDURE — 93458 L HRT ARTERY/VENTRICLE ANGIO: CPT | Performed by: INTERNAL MEDICINE

## 2025-06-06 PROCEDURE — C1769 GUIDE WIRE: HCPCS | Performed by: INTERNAL MEDICINE

## 2025-06-06 PROCEDURE — 4A023N7 MEASUREMENT OF CARDIAC SAMPLING AND PRESSURE, LEFT HEART, PERCUTANEOUS APPROACH: ICD-10-PCS | Performed by: INTERNAL MEDICINE

## 2025-06-06 PROCEDURE — 27000221 HC OXYGEN, UP TO 24 HOURS

## 2025-06-06 PROCEDURE — C1753 CATH, INTRAVAS ULTRASOUND: HCPCS | Performed by: INTERNAL MEDICINE

## 2025-06-06 PROCEDURE — 84484 ASSAY OF TROPONIN QUANT: CPT

## 2025-06-06 PROCEDURE — 93005 ELECTROCARDIOGRAM TRACING: CPT

## 2025-06-06 PROCEDURE — 63600175 PHARM REV CODE 636 W HCPCS: Performed by: INTERNAL MEDICINE

## 2025-06-06 PROCEDURE — 99153 MOD SED SAME PHYS/QHP EA: CPT | Performed by: INTERNAL MEDICINE

## 2025-06-06 PROCEDURE — C1887 CATHETER, GUIDING: HCPCS | Performed by: INTERNAL MEDICINE

## 2025-06-06 PROCEDURE — 80047 BASIC METABLC PNL IONIZED CA: CPT

## 2025-06-06 PROCEDURE — B2111ZZ FLUOROSCOPY OF MULTIPLE CORONARY ARTERIES USING LOW OSMOLAR CONTRAST: ICD-10-PCS | Performed by: INTERNAL MEDICINE

## 2025-06-06 PROCEDURE — 21400001 HC TELEMETRY ROOM

## 2025-06-06 RX ORDER — LIDOCAINE HYDROCHLORIDE 10 MG/ML
INJECTION, SOLUTION INFILTRATION; PERINEURAL
Status: DISCONTINUED | OUTPATIENT
Start: 2025-06-06 | End: 2025-06-06 | Stop reason: HOSPADM

## 2025-06-06 RX ORDER — ONDANSETRON HYDROCHLORIDE 2 MG/ML
4 INJECTION, SOLUTION INTRAVENOUS EVERY 8 HOURS PRN
Status: DISCONTINUED | OUTPATIENT
Start: 2025-06-06 | End: 2025-06-12 | Stop reason: HOSPADM

## 2025-06-06 RX ORDER — FENTANYL CITRATE 50 UG/ML
INJECTION, SOLUTION INTRAMUSCULAR; INTRAVENOUS
Status: DISCONTINUED | OUTPATIENT
Start: 2025-06-06 | End: 2025-06-06 | Stop reason: HOSPADM

## 2025-06-06 RX ORDER — HEPARIN SODIUM 5000 [USP'U]/ML
INJECTION, SOLUTION INTRAVENOUS; SUBCUTANEOUS
Status: COMPLETED
Start: 2025-06-06 | End: 2025-06-06

## 2025-06-06 RX ORDER — MIDODRINE HYDROCHLORIDE 2.5 MG/1
2.5 TABLET ORAL 3 TIMES DAILY
COMMUNITY
Start: 2025-06-04

## 2025-06-06 RX ORDER — ACETAMINOPHEN 325 MG/1
650 TABLET ORAL EVERY 4 HOURS PRN
Status: DISCONTINUED | OUTPATIENT
Start: 2025-06-06 | End: 2025-06-12 | Stop reason: HOSPADM

## 2025-06-06 RX ORDER — ASPIRIN 81 MG/1
81 TABLET ORAL ONCE
COMMUNITY

## 2025-06-06 RX ORDER — GABAPENTIN 300 MG/1
1 CAPSULE ORAL 2 TIMES DAILY
COMMUNITY

## 2025-06-06 RX ORDER — HYDROCODONE BITARTRATE AND ACETAMINOPHEN 5; 325 MG/1; MG/1
1 TABLET ORAL EVERY 4 HOURS PRN
Refills: 0 | Status: DISCONTINUED | OUTPATIENT
Start: 2025-06-06 | End: 2025-06-12 | Stop reason: HOSPADM

## 2025-06-06 RX ORDER — HEPARIN SODIUM 1000 [USP'U]/ML
INJECTION, SOLUTION INTRAVENOUS; SUBCUTANEOUS
Status: DISCONTINUED | OUTPATIENT
Start: 2025-06-06 | End: 2025-06-06 | Stop reason: HOSPADM

## 2025-06-06 RX ORDER — NITROGLYCERIN 0.4 MG/1
0.4 TABLET SUBLINGUAL EVERY 5 MIN PRN
Status: DISCONTINUED | OUTPATIENT
Start: 2025-06-06 | End: 2025-06-12 | Stop reason: HOSPADM

## 2025-06-06 RX ORDER — ASPIRIN 325 MG
1 TABLET, DELAYED RELEASE (ENTERIC COATED) ORAL DAILY
COMMUNITY

## 2025-06-06 RX ORDER — NITROGLYCERIN 20 MG/100ML
INJECTION INTRAVENOUS
Status: DISCONTINUED | OUTPATIENT
Start: 2025-06-06 | End: 2025-06-06 | Stop reason: HOSPADM

## 2025-06-06 RX ORDER — SODIUM CHLORIDE 9 MG/ML
INJECTION, SOLUTION INTRAVENOUS CONTINUOUS
Status: ACTIVE | OUTPATIENT
Start: 2025-06-06 | End: 2025-06-07

## 2025-06-06 RX ORDER — HEPARIN SODIUM 5000 [USP'U]/ML
5000 INJECTION, SOLUTION INTRAVENOUS; SUBCUTANEOUS
Status: COMPLETED | OUTPATIENT
Start: 2025-06-06 | End: 2025-06-06

## 2025-06-06 RX ORDER — MORPHINE SULFATE 4 MG/ML
2 INJECTION, SOLUTION INTRAMUSCULAR; INTRAVENOUS EVERY 4 HOURS PRN
Refills: 0 | Status: DISCONTINUED | OUTPATIENT
Start: 2025-06-06 | End: 2025-06-12 | Stop reason: HOSPADM

## 2025-06-06 RX ORDER — LEVOTHYROXINE SODIUM 25 UG/1
25 TABLET ORAL ONCE
COMMUNITY

## 2025-06-06 RX ORDER — HYDRALAZINE HYDROCHLORIDE 20 MG/ML
10 INJECTION INTRAMUSCULAR; INTRAVENOUS EVERY 4 HOURS PRN
Status: DISCONTINUED | OUTPATIENT
Start: 2025-06-06 | End: 2025-06-12 | Stop reason: HOSPADM

## 2025-06-06 RX ORDER — NEOMYCIN SULFATE, POLYMYXIN B SULFATE, AND DEXAMETHASONE 3.5; 10000; 1 MG/G; [USP'U]/G; MG/G
OINTMENT OPHTHALMIC
COMMUNITY
Start: 2025-03-10

## 2025-06-06 RX ORDER — CYCLOSPORINE 0.5 MG/ML
1 EMULSION OPHTHALMIC 2 TIMES DAILY
COMMUNITY

## 2025-06-06 RX ORDER — VERAPAMIL HYDROCHLORIDE 2.5 MG/ML
INJECTION INTRAVENOUS
Status: DISCONTINUED | OUTPATIENT
Start: 2025-06-06 | End: 2025-06-06 | Stop reason: HOSPADM

## 2025-06-06 RX ORDER — METHADONE HYDROCHLORIDE 5 MG/1
5 TABLET ORAL EVERY 8 HOURS PRN
COMMUNITY
Start: 2025-06-06

## 2025-06-06 RX ORDER — CABOZANTINIB 40 MG/1
1 TABLET ORAL
COMMUNITY

## 2025-06-06 RX ORDER — ONDANSETRON HYDROCHLORIDE 2 MG/ML
INJECTION, SOLUTION INTRAVENOUS
Status: DISCONTINUED | OUTPATIENT
Start: 2025-06-06 | End: 2025-06-06 | Stop reason: HOSPADM

## 2025-06-06 RX ORDER — BRIMONIDINE TARTRATE 2 MG/ML
1 SOLUTION/ DROPS OPHTHALMIC 2 TIMES DAILY
COMMUNITY

## 2025-06-06 RX ORDER — HYDROCODONE BITARTRATE AND ACETAMINOPHEN 10; 325 MG/1; MG/1
1 TABLET ORAL EVERY 6 HOURS PRN
COMMUNITY
Start: 2024-12-30

## 2025-06-06 RX ORDER — ASCORBIC ACID 500 MG
1000 TABLET ORAL DAILY
Status: ON HOLD | COMMUNITY
End: 2025-06-12 | Stop reason: HOSPADM

## 2025-06-06 RX ORDER — CALCITRIOL 0.25 UG/1
0.25 CAPSULE ORAL DAILY
COMMUNITY

## 2025-06-06 RX ADMIN — SODIUM CHLORIDE: 9 INJECTION, SOLUTION INTRAVENOUS at 08:06

## 2025-06-06 RX ADMIN — HEPARIN SODIUM 5000 UNITS: 5000 INJECTION, SOLUTION INTRAVENOUS; SUBCUTANEOUS at 06:06

## 2025-06-06 NOTE — Clinical Note
The catheter was inserted into the and was inserted over the wire into the distal   left anterior descending. IVUS PERFORMED

## 2025-06-06 NOTE — FIRST PROVIDER EVALUATION
Medical screening examination initiated.  I have conducted a focused provider triage encounter, findings are as follows:    Brief history of present illness:  arrived to ED due to weakness, hypotension, and hypoxia that has been ongoing for several days. Hx of renal cancer with mets. Goes to [place].     Vitals:    06/06/25 1729   BP: 97/64   Pulse: 104   Resp: 18   Temp: 98.6 °F (37 °C)   TempSrc: Oral   SpO2: 97%       Pertinent physical exam:  awake, alert, has non-labored breathing, pale, disoriented.     Brief workup plan:  labs, EKG, imaging     Preliminary workup initiated; this workup will be continued and followed by the physician or advanced practice provider that is assigned to the patient when roomed.

## 2025-06-06 NOTE — H&P
Ace wrap applied to right knee per dr Elsa Juarez [facility] - Emergency Dept  Cardiology  History and Physical     Patient Name: Robert Lubin  MRN: [id number]  Admission Date: 6/6/2025  Code Status: No Order   Attending Provider:    Primary Care Physician: Ankit Khanna Jr., MD  Principal Problem:<principal problem not specified>    Patient information was obtained from ER records.     Subjective:     Chief Complaint:  CP     HPI: 53 y/o with metastatic renal cell CA, DVT, CKD, CAD present to the ER with c/o chest discomfort and SOB.  EKG showed q waves in the inferior leads with 1 mm ST elevation.  He is on Eliquis.  Primary cardiologist is Dr. Valentino.  He had a recent Echo at [facility] with an EF of 58% and no significant valve abnormalities. I have been asked to evaluate the patient for STEMI presentation    No past medical history on file.    No past surgical history on file.    Review of patient's allergies indicates:  No Known Allergies    No current facility-administered medications on file prior to encounter.     Current Outpatient Medications on File Prior to Encounter   Medication Sig    apixaban (ELIQUIS) 5 mg Tab Take 5 mg by mouth 2 (two) times daily.    HYDROcodone-acetaminophen (NORCO)  mg per tablet Take 1 tablet by mouth every 6 (six) hours as needed for Pain.    methadone (DOLOPHINE) 5 MG tablet Take 5 mg by mouth every 8 (eight) hours as needed for Pain.    midodrine (PROAMATINE) 2.5 MG Tab Take 2.5 mg by mouth 3 (three) times daily.    neomycin-polymyxin-dexamethasone (DEXACINE) 3.5 mg/g-10,000 unit/g-0.1 % Oint Place into both eyes.    ascorbic acid, vitamin C, (VITAMIN C) 500 MG tablet Take 1,000 mg by mouth once daily.    aspirin (ECOTRIN) 81 MG EC tablet Take 81 mg by mouth once.    brimonidine 0.2% (ALPHAGAN) 0.2 % Drop Place 1 drop into both eyes 2 (two) times a day.    CABOMETYX 40 mg Tab Take 1 tablet by mouth.    calcitRIOL (ROCALTROL) 0.25 MCG Cap Take 0.25 mcg by mouth once daily.    cholecalciferol,  vitamin D3, 1,250 mcg (50,000 unit) capsule Take 1 capsule by mouth once daily.    cycloSPORINE (RESTASIS) 0.05 % ophthalmic emulsion Place 1 drop into both eyes 2 (two) times daily.    gabapentin (NEURONTIN) 300 MG capsule Take 1 capsule by mouth 2 (two) times daily.    levothyroxine (SYNTHROID) 25 MCG tablet Take 25 mcg by mouth once.     Family History    None       Tobacco Use    Smoking status: Not on file    Smokeless tobacco: Not on file   Substance and Sexual Activity    Alcohol use: Not on file    Drug use: Not on file    Sexual activity: Not on file     Review of Systems   Constitutional: Positive for decreased appetite.   Cardiovascular:  Positive for chest pain.   Respiratory:  Positive for shortness of breath.    All other systems reviewed and are negative.    Objective:     Vital Signs (Most Recent):  Temp: 98.6 °F (37 °C) (06/06/25 1729)  Pulse: 102 (06/06/25 1803)  Resp: 14 (06/06/25 1803)  BP: 107/77 (06/06/25 1803)  SpO2: 99 % (06/06/25 1803) Vital Signs (24h Range):  Temp:  [98.6 °F (37 °C)] 98.6 °F (37 °C)  Pulse:  [102-104] 102  Resp:  [14-18] 14  SpO2:  [97 %-99 %] 99 %  BP: ()/(64-77) 107/77        There is no height or weight on file to calculate BMI.    SpO2: 99 %       No intake or output data in the 24 hours ending 06/06/25 1828    Lines/Drains/Airways       Peripheral Intravenous Line  Duration                  Peripheral IV - Single Lumen 06/06/25 1801 18 G Anterior;Distal;Left Upper Arm <1 day         Peripheral IV - Single Lumen 06/06/25 1804 20 G Anterior;Right Forearm <1 day                    Physical Exam  Constitutional:       Appearance: Normal appearance.   HENT:      Head: Normocephalic and atraumatic.      Nose: Nose normal.      Mouth/Throat:      Comments: Appears to have dried blood around lips.  Eyes:      Pupils: Pupils are equal, round, and reactive to light.   Cardiovascular:      Rate and Rhythm: Normal rate.      Heart sounds: Normal heart sounds. No murmur  heard.  Pulmonary:      Breath sounds: Normal breath sounds.   Abdominal:      General: Abdomen is flat. Bowel sounds are normal.      Palpations: Abdomen is soft.   Musculoskeletal:      Cervical back: Neck supple.   Skin:     General: Skin is warm and dry.   Neurological:      General: No focal deficit present.      Mental Status: He is alert.   Psychiatric:         Mood and Affect: Mood normal.         Behavior: Behavior normal.         Significant Labs:   Recent Lab Results         06/06/25  1756   06/06/25  1755        Hematocrit 37.6         Hemoglobin 12.4         Lactic Acid Level   0.9       MCH 34.6         MCHC 33.0         .0         MPV 10.2         Platelet Count 503         RBC 3.58         RDW 13.9         Troponin I 2.253         WBC 10.26                 Significant Imaging: X-Ray: CXR: X-Ray Chest 1 View (CXR): No results found for this visit on 06/06/25.  Assessment and Plan:     STEMI-Inferior ST changes with elevated troponin  Renal cell CA with mets  DVT-on Eliquis  CKD-no BMP back for review yet    Plan  Risks, benefits, alternatives of left heart catheterization plus or minus intervention discussed in detail with patient.  Specific risks include but are not limited to stroke, death, heart attack, need for emergent surgery, bleeding, renal failure.  Patient voiced understanding and wishes to proceed.  Additional risks of bleeding on eliquis and worsening renal function explained in detail.  Patient is willing to proceed.  Will try to use minimal contrast  Will try radial 1st approach        Bhaskar Coffey MD  Cardiology   [facility] - Emergency Dept

## 2025-06-06 NOTE — ED PROVIDER NOTES
Encounter Date: 6/6/2025    SCRIBE #1 NOTE: I, Marija Henriquez, am scribing for, and in the presence of,  Stacia Hi MD. I have scribed the following portions of the note - Other sections scribed: HPI, ROS, PE.       History     Chief Complaint   Patient presents with    Hypotension     Hypotension, weakness & lethargy x1 week. Wife also reports low SPO2 at home (low 80s). On chemo for kidney cancer with mets to R lung & liver. Started midodrine yesterday     Patient is a 54 year old male with a history of renal cell carcinoma and MI presenting to the ED for hypotension, weakness, and lethargy for the past week. Today he endorses a sharp slight chest pain radiating away from the heart to the extremities. He claims the pain is present with swallowing and laughing. There is no pain with breathing. He endorses fatigue and generalized weakness. He denies diarrhea, constipation, or vomiting. Patient's wife at bedside claims he experienced hypotension throughout the week. He had Opdivo scheduled last week when his symptoms worsened. He was then sent to the ER in [place] for a blood pressure in the 70's/40's. He was then diagnosed with a DVT located in the femoral artery of the left calf. The patient was placed on 5 mg of Eliquis twice a day, beginning last Thursday 5/29/2025 following the DVT finding. He was released from the hospital Friday 5/30/2025, and his blood pressure has been irregular since. She claims his echocardiogram follow up visit with Dr. Valentino resulted in being prescribed sodium supplements along with Midodrine. He has taken 5 doses of Midodrine, with the first being yesterday morning 6/5/2025. Patient is on chemotherapy for renal cell carcinoma with metastasis to the right lung and liver.     Cardiologist: Vernon A. Valentino, MD, [facility]    The history is provided by the patient and the spouse. No  was used.     Review of patient's allergies indicates:  No Known  Allergies  No past medical history on file.  No past surgical history on file.  No family history on file.  Social History[1]  Review of Systems   Constitutional:  Positive for fatigue.        Endorses being lethargic.   Endorses generalized weakness.    Cardiovascular:  Positive for chest pain (Slight, sharp pain radiating away from the heart to the extremities. Pain is present with swallowing and laughing. Pain is not present with breathing.).        Endorses hypotension.    Gastrointestinal:  Negative for constipation, diarrhea and vomiting.       Physical Exam     Initial Vitals [06/06/25 1729]   BP Pulse Resp Temp SpO2   97/64 104 18 98.6 °F (37 °C) 97 %      MAP       --         Physical Exam    Nursing note and vitals reviewed.  Constitutional: He appears well-developed. He is not diaphoretic. He appears ill. No distress.   HENT:   Head: Normocephalic and atraumatic.   Nose: Nose normal. Mouth/Throat: Oropharynx is clear and moist.   Eyes: Conjunctivae and EOM are normal. Pupils are equal, round, and reactive to light.   Neck: Trachea normal. Neck supple.   Normal range of motion.  Cardiovascular:  Regular rhythm, normal heart sounds and intact distal pulses.   Tachycardia present.   Exam reveals no gallop and no friction rub.       No murmur heard.  Pulmonary/Chest: Breath sounds normal. No respiratory distress. He has no wheezes. He has no rhonchi. He has no rales. He exhibits no tenderness.   Abdominal: Abdomen is soft. Bowel sounds are normal. He exhibits no distension and no mass. There is no abdominal tenderness. There is no rebound.   Musculoskeletal:         General: No tenderness. Normal range of motion.      Cervical back: Normal range of motion and neck supple.      Lumbar back: Normal. No tenderness. Normal range of motion.      Comments: Left leg is larger than the right leg.      Neurological: He is alert and oriented to person, place, and time. He has normal strength. No cranial nerve deficit  or sensory deficit.   Skin: Skin is warm and dry. Capillary refill takes less than 2 seconds. No rash and no abscess noted. No erythema. There is pallor.   Psychiatric: He has a normal mood and affect. His behavior is normal. Judgment and thought content normal.         ED Course   Critical Care    Date/Time: 6/6/2025 6:53 PM    Performed by: Stacia Hi MD  Authorized by: Stacia Hi MD  Direct patient critical care time: 30 minutes  Ordering / reviewing critical care time: 5 minutes  Documentation critical care time: 10 minutes  Consulting other physicians critical care time: 10 minutes  Consult with family critical care time: 5 minutes  Total critical care time (exclusive of procedural time) : 60 minutes  Critical care was necessary to treat or prevent imminent or life-threatening deterioration of the following conditions: renal failure, respiratory failure, circulatory failure and cardiac failure.  Critical care was time spent personally by me on the following activities: development of treatment plan with patient or surrogate, discussions with consultants, interpretation of cardiac output measurements, evaluation of patient's response to treatment, examination of patient, obtaining history from patient or surrogate, ordering and performing treatments and interventions, ordering and review of laboratory studies, ordering and review of radiographic studies, pulse oximetry, re-evaluation of patient's condition and review of old charts.        Labs Reviewed   COMPREHENSIVE METABOLIC PANEL - Abnormal       Result Value    Sodium 141      Potassium 4.3      Chloride 106      CO2 25      Glucose 105 (*)     Blood Urea Nitrogen 38.6 (*)     Creatinine 2.25 (*)     Calcium 9.3      Protein Total 7.0      Albumin 2.4 (*)     Globulin 4.6 (*)     Albumin/Globulin Ratio 0.5 (*)     Bilirubin Total 0.5      ALP 63      ALT 18      AST 41      eGFR 34      Anion Gap 10.0      BUN/Creatinine Ratio 17      TROPONIN I - Abnormal    Troponin-I 2.253 (*)    CBC WITH DIFFERENTIAL - Abnormal    WBC 10.26      RBC 3.58 (*)     Hgb 12.4 (*)     Hct 37.6 (*)     .0 (*)     MCH 34.6 (*)     MCHC 33.0      RDW 13.9      Platelet 503 (*)     MPV 10.2     LACTIC ACID, PLASMA - Normal    Lactic Acid Level 0.9     BLOOD CULTURE [facility]   BLOOD CULTURE [facility]   CBC W/ AUTO DIFFERENTIAL    Narrative:     The following orders were created for panel order CBC auto differential.  Procedure                               Abnormality         Status                     ---------                               -----------         ------                     CBC with Differential[[id number]]       Abnormal            Final result               Manual Differential[[id number]]                             In process                   Please view results for these tests on the individual orders.   URINALYSIS, REFLEX TO URINE CULTURE   MANUAL DIFFERENTIAL          Imaging Results              X-Ray Chest AP Portable (Final result)  Result time 06/06/25 18:15:17      Final result by Lina Jones MD (06/06/25 18:15:17)                   Impression:      No acute cardiopulmonary abnormality.      Electronically signed by: Lina Jones  Date:    06/06/2025  Time:    18:15               Narrative:    EXAMINATION:  XR CHEST AP PORTABLE    CLINICAL HISTORY:  Weakness    TECHNIQUE:  Single frontal view of the chest was performed.    COMPARISON:  04/28/2016    FINDINGS:  LINES AND TUBES: EKG/telemetry leads overlie the chest.    MEDIASTINUM AND AYSHA: The cardiac silhouette is normal.    LUNGS: No lobar consolidation. No edema.    PLEURA:No pleural effusion. No pneumothorax.    BONES: No acute osseous abnormality.                                    X-Rays:   Independently Interpreted Readings:   Chest X-Ray: Normal heart size.  No infiltrates.  No acute abnormalities.     Medications   heparin (porcine) injection 5,000 Units (5,000 Units  Intravenous Given 6/6/25 1801)     Medical Decision Making  Differential diagnosis includes, but is not limited to, STEMI, pulmonary embolism, anemia, CHF, and renal failure.   Cbc, cmp, trop, lactic, cultures, EKG, cxr, ordereda ndr eviewed  Discussed with cardiology, activated as stemi  Reviewed last noac dose, given heparin, admit to cardiology  Symptoms concerning for pe but unable to say not a stemi especially with chest pain and EKG changes therefore discussion with caridology who recommended activating as stemi given above and bringing to cath lab    Problems Addressed:  Acute chest pain: acute illness or injury that poses a threat to life or bodily functions  Generalized weakness: acute illness or injury that poses a threat to life or bodily functions  Hypotension: acute illness or injury that poses a threat to life or bodily functions  Hypotension, unspecified hypotension type: acute illness or injury that poses a threat to life or bodily functions  Shortness of breath: acute illness or injury that poses a threat to life or bodily functions  ST elevation myocardial infarction (STEMI), unspecified artery: acute illness or injury that poses a threat to life or bodily functions  Stage 3b chronic kidney disease: chronic illness or injury  Weakness: acute illness or injury that poses a threat to life or bodily functions    Amount and/or Complexity of Data Reviewed  Independent Historian: spouse     Details: Patient's wife at bedside claims he experienced hypotension throughout the week. He had Opdivo scheduled last week when his symptoms worsened. He was then sent to the ER in [place] for a blood pressure in the 70's/40's. He was then diagnosed with a DVT located in the femoral artery of the left calf. The patient was placed on 5 mg of Eliquis twice a day, beginning last Thursday 5/29/2025 following the DVT finding. He was released from the hospital Friday 5/30/2025, and his blood pressure has been irregular  since. She claims his echocardiogram follow up visit with Dr. Valentino resulted in being prescribed sodium supplements along with Midodrine. He has taken 5 doses of Midodrine, with the first being yesterday morning 6/5/2025. Patient is on chemotherapy for renal cell carcinoma with metastasis to the right lung and liver.     Cardiologist: Vernon A. Valentino, MD, [facility]    External Data Reviewed: notes.     Details: Admit at [facility]  Labs: ordered. Decision-making details documented in ED Course.  Radiology: ordered and independent interpretation performed.  ECG/medicine tests: ordered and independent interpretation performed.    Risk  OTC drugs.  Prescription drug management.  Drug therapy requiring intensive monitoring for toxicity.  Decision regarding hospitalization.    Critical Care  Total time providing critical care: 60 minutes            Scribe Attestation:   Scribe #1: I performed the above scribed service and the documentation accurately describes the services I performed. I attest to the accuracy of the note.  Comments: Attending:   Physician Attestation Statement for Scribe #1: Stacia BENEDICT MD, personally performed the services described in this documentation. All medical record entries made by the scribe were at my direction and in my presence.  I have reviewed the chart and agree that the record reflects my personal performance and is accurate and complete.        Attending Attestation:           Physician Attestation for Scribe:  Physician Attestation Statement for Scribe #1: Sussy BENEDITC Brooke R, MD, reviewed documentation, as scribed by Marija Henriquez in my presence, and it is both accurate and complete.             ED Course as of 06/06/25 1903   Fri Jun 06, 2025   0156 I sent the EKG images to , discussed patient's presentation and atypical pain but does have a history of coronary disease and he reviewed the EKG and states that if the patient is having chest pain in his known  coronary disease with these EKG changes would recommend treating as a STEMI and calling out the cath lab, add lactic please  Last eliquis was this am [BS]   1757 EKG obtained at 5:36 p.m. rate of 103 sinus tachycardia with inferior ST-elevation [BS]   1854 Troponin I(!): 2.253 [BS]      ED Course User Index  [BS] Stacia Hi MD                           Clinical Impression:  Final diagnoses:  [I95.9] Hypotension  [R53.1] Weakness  [I21.3] ST elevation myocardial infarction (STEMI), unspecified artery  [R07.9] Acute chest pain (Primary)  [R06.02] Shortness of breath  [R53.1] Generalized weakness  [I95.9] Hypotension, unspecified hypotension type  [N18.32] Stage 3b chronic kidney disease          ED Disposition Condition    Admit                     Stacia Hi MD  06/06/25 0475         [1]         Stacia Hi MD  06/06/25 2080

## 2025-06-06 NOTE — Clinical Note
The catheter was inserted into the, was removed from the and was inserted over the wire into the distal   right coronary artery. IVUS PERFORMED

## 2025-06-07 LAB
ALBUMIN SERPL-MCNC: 2 G/DL (ref 3.5–5)
ALBUMIN/GLOB SERPL: 0.5 RATIO (ref 1.1–2)
ALP SERPL-CCNC: 54 UNIT/L (ref 40–150)
ALT SERPL-CCNC: 15 UNIT/L (ref 0–55)
ANION GAP SERPL CALC-SCNC: 8 MEQ/L
APTT PPP: 54.3 SECONDS (ref 23.2–33.7)
APTT PPP: 54.6 SECONDS (ref 23.2–33.7)
AST SERPL-CCNC: 27 UNIT/L (ref 11–45)
BACTERIA #/AREA URNS AUTO: ABNORMAL /HPF
BASOPHILS # BLD AUTO: 0.04 X10(3)/MCL
BASOPHILS # BLD AUTO: 0.05 X10(3)/MCL
BASOPHILS # BLD AUTO: 0.06 X10(3)/MCL
BASOPHILS NFR BLD AUTO: 0.5 %
BASOPHILS NFR BLD AUTO: 0.6 %
BASOPHILS NFR BLD AUTO: 0.7 %
BILIRUB SERPL-MCNC: 0.5 MG/DL
BILIRUB UR QL STRIP.AUTO: NEGATIVE
BNP BLD-MCNC: 134 PG/ML
BUN SERPL-MCNC: 35.6 MG/DL (ref 8.4–25.7)
CALCIUM SERPL-MCNC: 8.4 MG/DL (ref 8.4–10.2)
CHLORIDE SERPL-SCNC: 110 MMOL/L (ref 98–107)
CLARITY UR: CLEAR
CO2 SERPL-SCNC: 25 MMOL/L (ref 22–29)
COLOR UR AUTO: ABNORMAL
CREAT SERPL-MCNC: 1.99 MG/DL (ref 0.72–1.25)
CREAT/UREA NIT SERPL: 18
EOSINOPHIL # BLD AUTO: 0.75 X10(3)/MCL (ref 0–0.9)
EOSINOPHIL # BLD AUTO: 0.76 X10(3)/MCL (ref 0–0.9)
EOSINOPHIL # BLD AUTO: 0.87 X10(3)/MCL (ref 0–0.9)
EOSINOPHIL NFR BLD AUTO: 11.5 %
EOSINOPHIL NFR BLD AUTO: 8.9 %
EOSINOPHIL NFR BLD AUTO: 9.1 %
ERYTHROCYTE [DISTWIDTH] IN BLOOD BY AUTOMATED COUNT: 13.8 % (ref 11.5–17)
ERYTHROCYTE [DISTWIDTH] IN BLOOD BY AUTOMATED COUNT: 13.9 % (ref 11.5–17)
ERYTHROCYTE [DISTWIDTH] IN BLOOD BY AUTOMATED COUNT: 14 % (ref 11.5–17)
GFR SERPLBLD CREATININE-BSD FMLA CKD-EPI: 39 ML/MIN/1.73/M2
GLOBULIN SER-MCNC: 4 GM/DL (ref 2.4–3.5)
GLUCOSE SERPL-MCNC: 89 MG/DL (ref 74–100)
GLUCOSE UR QL STRIP: NORMAL
HCT VFR BLD AUTO: 29.1 % (ref 42–52)
HCT VFR BLD AUTO: 29.8 % (ref 42–52)
HCT VFR BLD AUTO: 30.2 % (ref 42–52)
HGB BLD-MCNC: 9.5 G/DL (ref 14–18)
HGB BLD-MCNC: 9.6 G/DL (ref 14–18)
HGB BLD-MCNC: 9.8 G/DL (ref 14–18)
HGB UR QL STRIP: NEGATIVE
IMM GRANULOCYTES # BLD AUTO: 0.03 X10(3)/MCL (ref 0–0.04)
IMM GRANULOCYTES # BLD AUTO: 0.04 X10(3)/MCL (ref 0–0.04)
IMM GRANULOCYTES # BLD AUTO: 0.04 X10(3)/MCL (ref 0–0.04)
IMM GRANULOCYTES NFR BLD AUTO: 0.4 %
IMM GRANULOCYTES NFR BLD AUTO: 0.5 %
IMM GRANULOCYTES NFR BLD AUTO: 0.5 %
INR PPP: 1.6
KETONES UR QL STRIP: ABNORMAL
LEUKOCYTE ESTERASE UR QL STRIP: NEGATIVE
LYMPHOCYTES # BLD AUTO: 1.12 X10(3)/MCL (ref 0.6–4.6)
LYMPHOCYTES # BLD AUTO: 1.37 X10(3)/MCL (ref 0.6–4.6)
LYMPHOCYTES # BLD AUTO: 1.49 X10(3)/MCL (ref 0.6–4.6)
LYMPHOCYTES NFR BLD AUTO: 14.8 %
LYMPHOCYTES NFR BLD AUTO: 16.5 %
LYMPHOCYTES NFR BLD AUTO: 17.7 %
MAGNESIUM SERPL-MCNC: 1.7 MG/DL (ref 1.6–2.6)
MCH RBC QN AUTO: 34.4 PG (ref 27–31)
MCH RBC QN AUTO: 35.2 PG (ref 27–31)
MCH RBC QN AUTO: 35.3 PG (ref 27–31)
MCHC RBC AUTO-ENTMCNC: 32.2 G/DL (ref 33–36)
MCHC RBC AUTO-ENTMCNC: 32.5 G/DL (ref 33–36)
MCHC RBC AUTO-ENTMCNC: 32.6 G/DL (ref 33–36)
MCV RBC AUTO: 106.8 FL (ref 80–94)
MCV RBC AUTO: 107.8 FL (ref 80–94)
MCV RBC AUTO: 108.6 FL (ref 80–94)
MONOCYTES # BLD AUTO: 1.33 X10(3)/MCL (ref 0.1–1.3)
MONOCYTES # BLD AUTO: 1.59 X10(3)/MCL (ref 0.1–1.3)
MONOCYTES # BLD AUTO: 1.61 X10(3)/MCL (ref 0.1–1.3)
MONOCYTES NFR BLD AUTO: 17.5 %
MONOCYTES NFR BLD AUTO: 18.9 %
MONOCYTES NFR BLD AUTO: 19.4 %
MUCOUS THREADS URNS QL MICRO: ABNORMAL /LPF
NEUTROPHILS # BLD AUTO: 4.19 X10(3)/MCL (ref 2.1–9.2)
NEUTROPHILS # BLD AUTO: 4.48 X10(3)/MCL (ref 2.1–9.2)
NEUTROPHILS # BLD AUTO: 4.5 X10(3)/MCL (ref 2.1–9.2)
NEUTROPHILS NFR BLD AUTO: 53.4 %
NEUTROPHILS NFR BLD AUTO: 53.8 %
NEUTROPHILS NFR BLD AUTO: 55.3 %
NITRITE UR QL STRIP: NEGATIVE
NRBC BLD AUTO-RTO: 0 %
OHS QRS DURATION: 84 MS
OHS QTC CALCULATION: 429 MS
PH UR STRIP: 5.5 [PH]
PLATELET # BLD AUTO: 451 X10(3)/MCL (ref 130–400)
PLATELET # BLD AUTO: 462 X10(3)/MCL (ref 130–400)
PLATELET # BLD AUTO: 491 X10(3)/MCL (ref 130–400)
PMV BLD AUTO: 10.4 FL (ref 7.4–10.4)
PMV BLD AUTO: 10.6 FL (ref 7.4–10.4)
PMV BLD AUTO: 10.6 FL (ref 7.4–10.4)
POTASSIUM SERPL-SCNC: 4.4 MMOL/L (ref 3.5–5.1)
PROT SERPL-MCNC: 6 GM/DL (ref 6.4–8.3)
PROT UR QL STRIP: ABNORMAL
PROTHROMBIN TIME: 18.6 SECONDS (ref 12.5–14.5)
RBC # BLD AUTO: 2.7 X10(6)/MCL (ref 4.7–6.1)
RBC # BLD AUTO: 2.78 X10(6)/MCL (ref 4.7–6.1)
RBC # BLD AUTO: 2.79 X10(6)/MCL (ref 4.7–6.1)
RBC #/AREA URNS AUTO: ABNORMAL /HPF
SODIUM SERPL-SCNC: 143 MMOL/L (ref 136–145)
SP GR UR STRIP.AUTO: 1.01 (ref 1–1.03)
SQUAMOUS #/AREA URNS LPF: ABNORMAL /HPF
TROPONIN I SERPL-MCNC: 2.35 NG/ML (ref 0–0.04)
TROPONIN I SERPL-MCNC: 4.22 NG/ML (ref 0–0.04)
UROBILINOGEN UR STRIP-ACNC: NORMAL
WBC # BLD AUTO: 7.58 X10(3)/MCL (ref 4.5–11.5)
WBC # BLD AUTO: 8.32 X10(3)/MCL (ref 4.5–11.5)
WBC # BLD AUTO: 8.42 X10(3)/MCL (ref 4.5–11.5)
WBC #/AREA URNS AUTO: ABNORMAL /HPF

## 2025-06-07 PROCEDURE — 80053 COMPREHEN METABOLIC PANEL: CPT

## 2025-06-07 PROCEDURE — 36415 COLL VENOUS BLD VENIPUNCTURE: CPT

## 2025-06-07 PROCEDURE — 85025 COMPLETE CBC W/AUTO DIFF WBC: CPT | Performed by: INTERNAL MEDICINE

## 2025-06-07 PROCEDURE — 81001 URINALYSIS AUTO W/SCOPE: CPT

## 2025-06-07 PROCEDURE — 25000003 PHARM REV CODE 250

## 2025-06-07 PROCEDURE — S0109 METHADONE ORAL 5MG: HCPCS | Performed by: INTERNAL MEDICINE

## 2025-06-07 PROCEDURE — 36415 COLL VENOUS BLD VENIPUNCTURE: CPT | Performed by: INTERNAL MEDICINE

## 2025-06-07 PROCEDURE — 63600175 PHARM REV CODE 636 W HCPCS: Performed by: INTERNAL MEDICINE

## 2025-06-07 PROCEDURE — 25000003 PHARM REV CODE 250: Performed by: INTERNAL MEDICINE

## 2025-06-07 PROCEDURE — 83880 ASSAY OF NATRIURETIC PEPTIDE: CPT

## 2025-06-07 PROCEDURE — 85730 THROMBOPLASTIN TIME PARTIAL: CPT | Performed by: INTERNAL MEDICINE

## 2025-06-07 PROCEDURE — 84484 ASSAY OF TROPONIN QUANT: CPT | Performed by: INTERNAL MEDICINE

## 2025-06-07 PROCEDURE — 85610 PROTHROMBIN TIME: CPT | Performed by: INTERNAL MEDICINE

## 2025-06-07 PROCEDURE — 85025 COMPLETE CBC W/AUTO DIFF WBC: CPT

## 2025-06-07 PROCEDURE — 21400001 HC TELEMETRY ROOM

## 2025-06-07 PROCEDURE — 83735 ASSAY OF MAGNESIUM: CPT

## 2025-06-07 RX ORDER — NEOMYCIN SULFATE, POLYMYXIN B SULFATE, AND DEXAMETHASONE 3.5; 10000; 1 MG/G; [USP'U]/G; MG/G
OINTMENT OPHTHALMIC EVERY 6 HOURS
Status: DISCONTINUED | OUTPATIENT
Start: 2025-06-07 | End: 2025-06-12 | Stop reason: HOSPADM

## 2025-06-07 RX ORDER — MIDODRINE HYDROCHLORIDE 2.5 MG/1
2.5 TABLET ORAL
Status: DISCONTINUED | OUTPATIENT
Start: 2025-06-07 | End: 2025-06-12 | Stop reason: HOSPADM

## 2025-06-07 RX ORDER — IBUPROFEN 200 MG
24 TABLET ORAL
Status: DISCONTINUED | OUTPATIENT
Start: 2025-06-07 | End: 2025-06-12 | Stop reason: HOSPADM

## 2025-06-07 RX ORDER — METHADONE HYDROCHLORIDE 5 MG/1
5 TABLET ORAL EVERY 8 HOURS
Refills: 0 | Status: DISCONTINUED | OUTPATIENT
Start: 2025-06-07 | End: 2025-06-07

## 2025-06-07 RX ORDER — SENNOSIDES 8.6 MG/1
8.6 TABLET ORAL DAILY
Status: DISCONTINUED | OUTPATIENT
Start: 2025-06-07 | End: 2025-06-09

## 2025-06-07 RX ORDER — METHADONE HYDROCHLORIDE 5 MG/5ML
5 SOLUTION ORAL EVERY 8 HOURS
Refills: 0 | Status: DISCONTINUED | OUTPATIENT
Start: 2025-06-07 | End: 2025-06-12 | Stop reason: HOSPADM

## 2025-06-07 RX ORDER — POLYETHYLENE GLYCOL 3350 17 G/17G
17 POWDER, FOR SOLUTION ORAL 2 TIMES DAILY
Status: DISCONTINUED | OUTPATIENT
Start: 2025-06-07 | End: 2025-06-09

## 2025-06-07 RX ORDER — INSULIN ASPART 100 [IU]/ML
0-10 INJECTION, SOLUTION INTRAVENOUS; SUBCUTANEOUS
Status: DISCONTINUED | OUTPATIENT
Start: 2025-06-07 | End: 2025-06-12 | Stop reason: HOSPADM

## 2025-06-07 RX ORDER — GLUCAGON 1 MG
1 KIT INJECTION
Status: DISCONTINUED | OUTPATIENT
Start: 2025-06-07 | End: 2025-06-12 | Stop reason: HOSPADM

## 2025-06-07 RX ORDER — CALCITRIOL 0.25 UG/1
0.25 CAPSULE ORAL DAILY
Status: DISCONTINUED | OUTPATIENT
Start: 2025-06-07 | End: 2025-06-12 | Stop reason: HOSPADM

## 2025-06-07 RX ORDER — HEPARIN SODIUM,PORCINE/D5W 25000/250
0-40 INTRAVENOUS SOLUTION INTRAVENOUS CONTINUOUS
Status: DISPENSED | OUTPATIENT
Start: 2025-06-07 | End: 2025-06-10

## 2025-06-07 RX ORDER — ASPIRIN 325 MG
50000 TABLET, DELAYED RELEASE (ENTERIC COATED) ORAL DAILY
Status: DISCONTINUED | OUTPATIENT
Start: 2025-06-07 | End: 2025-06-12 | Stop reason: HOSPADM

## 2025-06-07 RX ORDER — ASPIRIN 81 MG/1
81 TABLET ORAL DAILY
Status: DISCONTINUED | OUTPATIENT
Start: 2025-06-07 | End: 2025-06-09

## 2025-06-07 RX ORDER — ASCORBIC ACID 250 MG
1000 TABLET ORAL DAILY
Status: DISCONTINUED | OUTPATIENT
Start: 2025-06-07 | End: 2025-06-12 | Stop reason: HOSPADM

## 2025-06-07 RX ORDER — IBUPROFEN 200 MG
16 TABLET ORAL
Status: DISCONTINUED | OUTPATIENT
Start: 2025-06-07 | End: 2025-06-12 | Stop reason: HOSPADM

## 2025-06-07 RX ADMIN — HEPARIN SODIUM 12 UNITS/KG/HR: 10000 INJECTION, SOLUTION INTRAVENOUS at 02:06

## 2025-06-07 RX ADMIN — MIDODRINE HYDROCHLORIDE 2.5 MG: 2.5 TABLET ORAL at 11:06

## 2025-06-07 RX ADMIN — SENNOSIDES 8.6 MG: 8.6 TABLET, FILM COATED ORAL at 11:06

## 2025-06-07 RX ADMIN — HYPROMELLOSE 2910 2 DROP: 5 SOLUTION/ DROPS OPHTHALMIC at 09:06

## 2025-06-07 RX ADMIN — METHADONE HYDROCHLORIDE 5 MG: 5 SOLUTION ORAL at 11:06

## 2025-06-07 RX ADMIN — MIDODRINE HYDROCHLORIDE 2.5 MG: 2.5 TABLET ORAL at 08:06

## 2025-06-07 RX ADMIN — METHADONE HYDROCHLORIDE 5 MG: 5 SOLUTION ORAL at 09:06

## 2025-06-07 RX ADMIN — ASPIRIN 81 MG: 81 TABLET, DELAYED RELEASE ORAL at 03:06

## 2025-06-07 RX ADMIN — MIDODRINE HYDROCHLORIDE 2.5 MG: 2.5 TABLET ORAL at 05:06

## 2025-06-07 RX ADMIN — HYPROMELLOSE 2910 2 DROP: 5 SOLUTION/ DROPS OPHTHALMIC at 06:06

## 2025-06-07 RX ADMIN — NEOMYCIN AND POLYMYXIN B SULFATES AND DEXAMETHASONE: 3.5; 10000; 1 OINTMENT OPHTHALMIC at 06:06

## 2025-06-07 RX ADMIN — Medication 1000 MG: at 11:06

## 2025-06-07 RX ADMIN — CALCITRIOL CAPSULES 0.25 MCG 0.25 MCG: 0.25 CAPSULE ORAL at 11:06

## 2025-06-07 RX ADMIN — NEOMYCIN AND POLYMYXIN B SULFATES AND DEXAMETHASONE: 3.5; 10000; 1 OINTMENT OPHTHALMIC at 09:06

## 2025-06-07 RX ADMIN — HYPROMELLOSE 2910 2 DROP: 5 SOLUTION/ DROPS OPHTHALMIC at 03:06

## 2025-06-07 RX ADMIN — POLYETHYLENE GLYCOL 3350 17 G: 17 POWDER, FOR SOLUTION ORAL at 11:06

## 2025-06-07 NOTE — CONSULTS
Hospital Medicine  Consultation Note    Patient: Robert Lubin  MRN: [id number]  STATUS: IP- Inpatient   Date of Admit: 6/6/2025  Date of Service: 6/7/2025  Referring Physician: Bhaskar Coffey MD  PCP: Ankit Khanna Jr., MD    REASON FOR CONSULT: chest pain       HISTORY OF PRESENT ILLNESS       54 y.o. 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  EKG showed q waves in the inferior leads with 1 mm ST elevation.  Initial troponin 2.2 (subsequent 2.4->2.3).  Additional labs included mostly unremarkable CBC with exception of mild anemia, white count normal.  Chemistries consistent with his baseline CKD IIIB.  He was admitted to Cardiology services yesterday and underwent LHC, with caution to contrast load; findings however noted CAD, though right heart strain noted, concern for PE.  Echo shows an EF 40-45% with mild global hypokinesis  (recent Echo at [facility] with an EF of 58%).  Cardiology concerned for PE; hospital medicine consulted for further input.    Recent history notes patient was due for Opdivo last week but was experiencing hypotension. He was then sent to the ED at [facility] for a blood pressure in the 70's/40's.  He was then diagnosed with a RLE DVT.  Wife reports that a V/Q scan at that time was negative.  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  They note his blood pressures has been irregular since.  He did following up with his Cardiologist once back here in [place], and was started on sodium supplements along with Midodrine.  Both his oral and IV chemo remain on hold at this time.      REVIEW OF SYSTEMS     Except as documented, all other systems reviewed and  negative       PAST MEDICAL HISTORY     Von Hippel-Lindau syndrome   Bilateral renal cell carcinoma  Multiple hemangioblastomas (including intracranial)  DVT  CAD  CKD  GERD  JIN  Hypothyroidism      PAST SURGICAL HISTORY     T6-9 laminectomy for resection of hemangioblastoma Resection of cerebelllar hemangioblastoma  Coronary angiogram with stent placement, RCA  L4 laminectomy for resection of hemangioblastoma  Left partial nephrectomy   Right partial nephrectomy  VATS x3 with therapeutic wedge resection  Excision of cyst from scrotum      FAMILY HISTORY     Reviewed, negative in relation to patient's current condition.      SOCIAL HISTORY     Denies alcohol, tobacco or drug abuse      ALLERGIES     NKDA      HOME MEDICATIONS      apixaban (ELIQUIS) 5 mg, 2 times daily    ascorbic acid (vitamin C) (VITAMIN C) 1,000 mg, Oral, Daily    aspirin (ECOTRIN) 81 mg, Oral, Once    brimonidine 0.2% (ALPHAGAN) 0.2 % Drop 1 drop, Both Eyes, 2 times daily    CABOMETYX 40 mg Tab 1 tablet, Oral    calcitRIOL (ROCALTROL) 0.25 mcg, Oral, Daily    cholecalciferol, vitamin D3, 1,250 mcg (50,000 unit) capsule 1 capsule, Oral, Daily    cycloSPORINE (RESTASIS) 0.05 % ophthalmic emulsion 1 drop, Both Eyes, 2 times daily    gabapentin (NEURONTIN) 300 MG capsule 1 capsule, Oral, 2 times daily    HYDROcodone-acetaminophen (NORCO) 10-325mg/tab 1 tablet, Every 6 hours PRN    levothyroxine (SYNTHROID) 25 mcg, Oral, Once    methadone (DOLOPHINE) 5 mg, Every 8 hours PRN    midodrine (PROAMATINE) 2.5 mg, 3 times daily    neomycin-polymyxin-dexamethasone (DEXACINE) 3.5 mg/g-10,000 unit/g (0.1 % Oint) Place into both eyes.       INPATIENT MEDICATIONS   Scheduled   midodrine  2.5 mg Oral TID WM     Continuous Infusions  None      PHYSICAL EXAM   VITALS: T 97.7 °F (36.5 °C)   /70   P 88   RR 18   O2 97 %    GENERAL: Drowsy, but awakens easily, in NAD, on room air  HEENT: NC/AT, EOMI, PERRL.  NECK: Supple,  No JVD  LUNGS: CTA B/L  CVS: RRR, S1S2  positive  GI/: Soft, NT/ND, bowel sounds positive.  EXTREMITIES: Peripheral pulses 2+, mild RLE edema  DERM: Warm, dry.  No rashes or lesions noted.  NEURO: AAOx3, no focal neurologic deficit  PSYCH: Cooperative, appropriate mood and affect       DIAGNOSTICS     Recent Labs     06/07/25  0053 06/07/25  0420   WBC 8.42 8.32   RBC 2.79* 2.70*   HGB 9.6* 9.5*   HCT 29.8* 29.1*   .8* 107.8*   MCH 34.4* 35.2*   MCHC 32.2* 32.6*   RDW 13.9 14.0   * 462*      Recent Labs     06/06/25  1756 06/07/25  0742    143   K 4.3 4.4   CO2 25 25   BUN 38.6* 35.6*   CREATININE 2.25* 1.99*   CALCIUM 9.3 8.4   MG  --  1.70   ALBUMIN 2.4* 2.0*   GLOBULIN 4.6* 4.0*   ALKPHOS 63 54   ALT 18 15   AST 41 27   BILITOT 0.5 0.5     Recent Labs     06/06/25 1756 06/06/25 2021 06/07/25  0742   BNP  --   --  134.0*   TROPONINI 2.253* 2.404* 2.347*        Echo   Saline Bubble? No; Ultrasound enhancing contrast? No    Left Ventricle: The left ventricle is normal in size. Increased wall   thickness. Mild global hypokinesis present. There is mildly reduced   systolic function with a visually estimated ejection fraction of 40 - 45%.    Right Ventricle: The right ventricle is normal in size Systolic   function is normal.    Pulmonary Artery: No pulmonary hypertension.    Cardiac catheterization  Findings:  1. Left main-long, normal  2. Lad-64% calcified mid stenosis by IVUS (MLA 4.8mm2) diagonal 1-99%   stenosis  3. LCX-non dominant, Calcified 50% stenosis mid segment, collateral noted   to presumed RV marginal, likely jailed from old RCA stent  4. RCA-dominant, Patent mid stent, appears undersized for vessel on IVUS,   no thrombus or ISR noted  5. EDP 10  Plan:  1. Serial troponins  2. Echo for RV strain  3. High suspicion for PE.  Will consult Hospitalist to assume care  given   co morbidities.  CTA with contrast will further put patient at risk for   SHALOM.  They may want to consider VQ scan.  4.  Patient also immunocompromised  and at risk for infection and bleeding.  5. Would consider Heparin gtt instead of lovenox in case bleeding becomes   an issue.    6. Discussed plan in detail with patient's wife.  7. Medical MNGt for CAD with asa, heparin.  Would like to add plavix but   patient has brain mas per family member, unsure if it is contraindicated   in this setting.    X-Ray Chest AP Portable  Narrative:   LINES AND TUBES: EKG/telemetry leads overlie the chest.  MEDIASTINUM AND AYSHA: The cardiac silhouette is normal.  LUNGS: No lobar consolidation. No edema.  PLEURA:No pleural effusion. No pneumothorax.  BONES: No acute osseous abnormality.  Impression:   No acute cardiopulmonary abnormality.  Electronically signed by: Lina Jones  Date:    06/06/2025  Time:    18:15      ASSESSMENT     STEMI  RLE DVT, r/o PE  Ongoing hypotension  h/o metastatic renal cell carcinoma with liver/lung invovlement  h/o VHL syndrome with multiple hemangioblastomas, including intracranial  Constipation, on opioid therapy  CKD IIIB    PLAN     Start minimal intensity heparin infusion, will obtain VQ scan  Will monitor closely for sign of bleeding, including intracranial  Cardiology would optimally like DAPT, but addition of Plavix may be too risky.    Will continue with ASA/statin for now.    Further GDMT limited by blood pressures  Continue midodrine  Will intensify bowel regimen continue with Senokot, will add MiraLax; Dulcolax as needed, if constipation continues consider adding selective opoid antagonist  Given complicated nature, will assume primary care if ok with cards        Toi Bonilla MD  [facility]  06/07/2025      Critical Care Time: 55 minutes spent in direct hands on care, review of labs, imaging and medical record, and discussion of diagnosis, treatment, prognosis with patient/family.  Patient remains at high-risk for clinical decompensation  Critical Care diagnosis: STEMI

## 2025-06-07 NOTE — PROGRESS NOTES
[facility]    Cardiology  Progress Note    Patient Name: Robert Lubin  MRN: [id number]  Admission Date: 6/6/2025  Hospital Length of Stay: 1 days  Code Status: No Order   Attending Physician: Toi Bonilla MD   Primary Care Physician: Ankit Khanna Jr., MD  Expected Discharge Date:   Principal Problem:<principal problem not specified>    Subjective:     Brief HPI/Hospital Course: 53 y/o with metastatic renal cell CA, DVT, CKD, CAD present to the ER with c/o chest discomfort and SOB. EKG showed q waves in the inferior leads with 1 mm ST elevation. He is on Eliquis. Primary cardiologist is Dr. Valentino. He had a recent Echo at [facility] with an EF of 58% and no significant valve abnormalities. I have been asked to evaluate the patient for STEMI presentation.    Hospital Course:  6.7.25: s/p LHC. Denying CP, palpitations. PE study pending. Somewhat hypotensive, other VSS. Hospital medicine primary team.     PMH: Von Hippel-Lindau syndrome, Bilateral renal cell carcinoma, Multiple hemangioblastomas (including intracranial), DVT, CAD. CKD, GERD, JIN, Hypothyroidism  PSH:  T6-9 laminectomy for resection of hemangioblastoma Resection of cerebelllar hemangioblastoma, Coronary angiogram with stent placement, RCA, L4 laminectomy for resection of hemangioblastoma, Left partial nephrectomy , Right partial nephrectomy, VATS x3 with therapeutic wedge resection, Excision of cyst from scrotum  Family History: None  Social History: Denies tobacco, alcohol, or other illicit drug use     Previous Diagnostics:  Echo 6.6.25    Left Ventricle: The left ventricle is normal in size. Increased wall thickness. Mild global hypokinesis present. There is mildly reduced systolic function with a visually estimated ejection fraction of 40 - 45%.    Right Ventricle: The right ventricle is normal in size Systolic function is normal.    Pulmonary Artery: No pulmonary hypertension.    [facility]  6.6.25  Preprocedure diagnosis-STEMI  Postprocedure diagnosis-Obstructive CAD, no obvious culprit or acute vessel closure  Estimated blood loss-5 cc  Tissue removal-none  Complications-none  Contrast 20 cc  Procedures performed:  1. Ultrasound-guided right radial access  2. Coronary angiography, limited contrast  3. Left ventricular hemodynamics  4. 3 vessel IVUS to conserve contrast use  5. TR band access site hemostasis  Findings:  1. Left main-long, normal  2. Lad-64% calcified mid stenosis by IVUS (MLA 4.8mm2) diagonal 1-99% stenosis  3. LCX-non dominant, Calcified 50% stenosis mid segment, collateral noted to presumed RV marginal, likely jailed from old RCA stent  4. RCA-dominant, Patent mid stent, appears undersized for vessel on IVUS, no thrombus or ISR noted  5. EDP 10  Procedure detail:  Ultrasound-guided right radial access obtained.  Sheath inserted.  Vasodilators and heparin administered.  Tiger catheter used for left ventricular hemodynamics.  Catheter used for selective left coronary angiography.  Catheter kept engaging the conus.  I decided to exchange for a JR4 guide to IVUS and minimize contrast use.  Guide was placed in the RCA.  Wire advanced into the PDA.  IVUS performed.  Final angio was satisfactory.  Guide removed.  EBU guide was placed in the LM.  1 set up shot in Ap caudal performed.  Wire placed in the LAD and IVUS performed.  Wire redirected into the circ and IVUS performed. Catheter was removed and a TR band was utilized for access site hemostasis.   Plan:  1. Serial troponins  2. Echo for RV strain  3. High suspicion for PE.  Will consult Hospitalist to assume care  given co morbidities.  CTA with contrast will further put patient at risk for SHALOM.  They may want to consider VQ scan.  4.  Patient also immunocompromised and at risk for infection and bleeding.  5. Would consider Heparin gtt instead of lovenox in case bleeding becomes an issue.    6. Discussed plan in detail with patient's  "wife.  7. Medical MNGt for CAD with asa, heparin.  Would like to add plavix but patient has brain mas per family member, unsure if it is contraindicated in this setting.    Review of Systems   Constitutional: Negative for malaise/fatigue.   Cardiovascular:  Negative for chest pain, dyspnea on exertion, irregular heartbeat, near-syncope, palpitations and syncope.   Respiratory:  Negative for wheezing.    All other systems reviewed and are negative.    Objective:     Vital Signs (Most Recent):  Temp: 98.3 °F (36.8 °C) (06/07/25 1209)  Pulse: 83 (06/07/25 1209)  Resp: 18 (06/07/25 1106)  BP: 105/72 (06/07/25 1209)  SpO2: 98 % (06/07/25 1209) Vital Signs (24h Range):  Temp:  [97.7 °F (36.5 °C)-99.1 °F (37.3 °C)] 98.3 °F (36.8 °C)  Pulse:  [] 83  Resp:  [14-27] 18  SpO2:  [90 %-99 %] 98 %  BP: ()/(47-77) 105/72   Weight: 76.2 kg (168 lb)  There is no height or weight on file to calculate BMI.  SpO2: 98 %     No intake or output data in the 24 hours ending 06/07/25 1300  Lines/Drains/Airways       Peripheral Intravenous Line  Duration                  Peripheral IV - Single Lumen 06/06/25 1801 18 G Anterior;Distal;Left Upper Arm <1 day         Peripheral IV - Single Lumen 06/06/25 1804 20 G Anterior;Right Forearm <1 day                    Significant Labs:   Chemistries:   Recent Labs   Lab 06/06/25  1756 06/06/25 2021 06/07/25  0742     --  143   K 4.3  --  4.4     --  110*   CO2 25  --  25   BUN 38.6*  --  35.6*   CREATININE 2.25*  --  1.99*   CALCIUM 9.3  --  8.4   PROT 7.0  --  6.0*   BILITOT 0.5  --  0.5   ALKPHOS 63  --  54   ALT 18  --  15   AST 41  --  27   MG  --   --  1.70   TROPONINI 2.253* 2.404* 2.347*        CBC/Anemia Labs: [facility]:    Recent Labs   Lab 06/06/25  1756 06/07/25  0053 06/07/25  0420   WBC 10.26  10.26 8.42 8.32   HGB 12.4* 9.6* 9.5*   HCT 37.6* 29.8* 29.1*   * 451* 462*   .0* 106.8* 107.8*   RDW 13.9 13.9 14.0    No results for input(s): "PT", "INR", " ""APTT" in the last 168 hours.     Telemetry:  SR, HR 80s    Physical Exam  Vitals reviewed.   Constitutional:       General: He is not in acute distress.     Appearance: Normal appearance. He is ill-appearing.   HENT:      Head: Normocephalic and atraumatic.   Cardiovascular:      Rate and Rhythm: Normal rate and regular rhythm.      Pulses: Normal pulses.      Heart sounds: No murmur heard.  Pulmonary:      Effort: Pulmonary effort is normal. No respiratory distress.   Abdominal:      General: There is no distension.   Musculoskeletal:      Right lower leg: No edema.      Left lower leg: No edema.   Skin:     General: Skin is warm and dry.   Neurological:      Mental Status: He is alert.         Current Schedule Inpatient Medications:   artificial tears  2 drop Both Eyes Q4H While awake    ascorbic acid (vitamin C)  1,000 mg Oral Daily    calcitRIOL  0.25 mcg Oral Daily    cholecalciferol (vitamin D3)  50,000 Units Oral Daily    methadone  5 mg Oral Q8H    midodrine  2.5 mg Oral TID WM    polyethylene glycol  17 g Oral BID    senna  8.6 mg Oral Daily     Continuous Infusions:   heparin (porcine) in D5W  0-40 Units/kg/hr Intravenous Continuous           Assessment:   STEMI-Inferior ST Changes with Elevated Troponin  Obstructive CAD   - No obvious culprit or acute vessel closure per [facility] 6.6.25  Renal cell CA with mets  DVT-on Eliquis  Suspicions for PE  CKD      Plan:   S/p [facility]  Echo reviewed  Continue ASA and Statin  Would ideally like Plavix on board, but patient has brain mass per family member, so unsure if it is contraindicated in this setting  Continue other meds per primary team  PE study pending  Labs for today still pending   Daily labs: CBC, CMP, K, Mg  Remain on Tele       Luciana Urban PA-C  Cardiology  [facility]   "

## 2025-06-08 LAB
ALBUMIN SERPL-MCNC: 1.9 G/DL (ref 3.5–5)
ALBUMIN/GLOB SERPL: 0.5 RATIO (ref 1.1–2)
ALP SERPL-CCNC: 53 UNIT/L (ref 40–150)
ALT SERPL-CCNC: 14 UNIT/L (ref 0–55)
ANION GAP SERPL CALC-SCNC: 9 MEQ/L
APTT PPP: 42.9 SECONDS (ref 23.2–33.7)
APTT PPP: 58 SECONDS (ref 23.2–33.7)
AST SERPL-CCNC: 27 UNIT/L (ref 11–45)
BASOPHILS # BLD AUTO: 0.05 X10(3)/MCL
BASOPHILS NFR BLD AUTO: 0.6 %
BILIRUB SERPL-MCNC: 0.6 MG/DL
BUN SERPL-MCNC: 29 MG/DL (ref 8.4–25.7)
CALCIUM SERPL-MCNC: 8.4 MG/DL (ref 8.4–10.2)
CHLORIDE SERPL-SCNC: 109 MMOL/L (ref 98–107)
CO2 SERPL-SCNC: 24 MMOL/L (ref 22–29)
CREAT SERPL-MCNC: 1.88 MG/DL (ref 0.72–1.25)
CREAT/UREA NIT SERPL: 15
EOSINOPHIL # BLD AUTO: 1.01 X10(3)/MCL (ref 0–0.9)
EOSINOPHIL NFR BLD AUTO: 12.9 %
ERYTHROCYTE [DISTWIDTH] IN BLOOD BY AUTOMATED COUNT: 13.6 % (ref 11.5–17)
GFR SERPLBLD CREATININE-BSD FMLA CKD-EPI: 42 ML/MIN/1.73/M2
GLOBULIN SER-MCNC: 4.1 GM/DL (ref 2.4–3.5)
GLUCOSE SERPL-MCNC: 91 MG/DL (ref 74–100)
HCT VFR BLD AUTO: 31.9 % (ref 42–52)
HGB BLD-MCNC: 10.1 G/DL (ref 14–18)
IMM GRANULOCYTES # BLD AUTO: 0.03 X10(3)/MCL (ref 0–0.04)
IMM GRANULOCYTES NFR BLD AUTO: 0.4 %
LYMPHOCYTES # BLD AUTO: 1.56 X10(3)/MCL (ref 0.6–4.6)
LYMPHOCYTES NFR BLD AUTO: 20 %
MCH RBC QN AUTO: 34.7 PG (ref 27–31)
MCHC RBC AUTO-ENTMCNC: 31.7 G/DL (ref 33–36)
MCV RBC AUTO: 109.6 FL (ref 80–94)
MONOCYTES # BLD AUTO: 1.09 X10(3)/MCL (ref 0.1–1.3)
MONOCYTES NFR BLD AUTO: 14 %
NEUTROPHILS # BLD AUTO: 4.06 X10(3)/MCL (ref 2.1–9.2)
NEUTROPHILS NFR BLD AUTO: 52.1 %
NRBC BLD AUTO-RTO: 0 %
PLATELET # BLD AUTO: 538 X10(3)/MCL (ref 130–400)
PMV BLD AUTO: 10.3 FL (ref 7.4–10.4)
POTASSIUM SERPL-SCNC: 4.7 MMOL/L (ref 3.5–5.1)
PROT SERPL-MCNC: 6 GM/DL (ref 6.4–8.3)
RBC # BLD AUTO: 2.91 X10(6)/MCL (ref 4.7–6.1)
SODIUM SERPL-SCNC: 142 MMOL/L (ref 136–145)
WBC # BLD AUTO: 7.8 X10(3)/MCL (ref 4.5–11.5)

## 2025-06-08 PROCEDURE — A9540 TC99M MAA: HCPCS | Performed by: INTERNAL MEDICINE

## 2025-06-08 PROCEDURE — A9567 TECHNETIUM TC-99M AEROSOL: HCPCS | Performed by: INTERNAL MEDICINE

## 2025-06-08 PROCEDURE — S0109 METHADONE ORAL 5MG: HCPCS | Performed by: INTERNAL MEDICINE

## 2025-06-08 PROCEDURE — 25000003 PHARM REV CODE 250

## 2025-06-08 PROCEDURE — 85025 COMPLETE CBC W/AUTO DIFF WBC: CPT | Performed by: INTERNAL MEDICINE

## 2025-06-08 PROCEDURE — 21400001 HC TELEMETRY ROOM

## 2025-06-08 PROCEDURE — 85730 THROMBOPLASTIN TIME PARTIAL: CPT | Performed by: INTERNAL MEDICINE

## 2025-06-08 PROCEDURE — 63600175 PHARM REV CODE 636 W HCPCS: Performed by: INTERNAL MEDICINE

## 2025-06-08 PROCEDURE — 84134 ASSAY OF PREALBUMIN: CPT | Performed by: INTERNAL MEDICINE

## 2025-06-08 PROCEDURE — 80053 COMPREHEN METABOLIC PANEL: CPT | Performed by: INTERNAL MEDICINE

## 2025-06-08 PROCEDURE — 36415 COLL VENOUS BLD VENIPUNCTURE: CPT | Performed by: INTERNAL MEDICINE

## 2025-06-08 PROCEDURE — 25000003 PHARM REV CODE 250: Performed by: INTERNAL MEDICINE

## 2025-06-08 RX ADMIN — Medication 1000 MG: at 08:06

## 2025-06-08 RX ADMIN — SENNOSIDES 8.6 MG: 8.6 TABLET, FILM COATED ORAL at 08:06

## 2025-06-08 RX ADMIN — CALCITRIOL CAPSULES 0.25 MCG 0.25 MCG: 0.25 CAPSULE ORAL at 08:06

## 2025-06-08 RX ADMIN — METHADONE HYDROCHLORIDE 5 MG: 5 SOLUTION ORAL at 04:06

## 2025-06-08 RX ADMIN — MIDODRINE HYDROCHLORIDE 2.5 MG: 2.5 TABLET ORAL at 08:06

## 2025-06-08 RX ADMIN — KIT FOR THE PREPARATION OF TECHNETIUM TC 99M PENTETATE 35.2 MILLICURIE: 20 INJECTION, POWDER, LYOPHILIZED, FOR SOLUTION INTRAVENOUS; RESPIRATORY (INHALATION) at 03:06

## 2025-06-08 RX ADMIN — KIT FOR THE PREPARATION OF TECHNETIUM TC 99M ALBUMIN AGGREGATED 4.8 MILLICURIE: 2 INJECTION, POWDER, LYOPHILIZED, FOR SUSPENSION INTRAPERITONEAL; INTRAVENOUS at 03:06

## 2025-06-08 RX ADMIN — METHADONE HYDROCHLORIDE 5 MG: 5 SOLUTION ORAL at 05:06

## 2025-06-08 RX ADMIN — ASPIRIN 81 MG: 81 TABLET, DELAYED RELEASE ORAL at 08:06

## 2025-06-08 RX ADMIN — METHADONE HYDROCHLORIDE 5 MG: 5 SOLUTION ORAL at 09:06

## 2025-06-08 RX ADMIN — POLYETHYLENE GLYCOL 3350 17 G: 17 POWDER, FOR SOLUTION ORAL at 09:06

## 2025-06-08 RX ADMIN — NEOMYCIN AND POLYMYXIN B SULFATES AND DEXAMETHASONE: 3.5; 10000; 1 OINTMENT OPHTHALMIC at 08:06

## 2025-06-08 RX ADMIN — HEPARIN SODIUM 14 UNITS/KG/HR: 10000 INJECTION, SOLUTION INTRAVENOUS at 01:06

## 2025-06-08 RX ADMIN — MIDODRINE HYDROCHLORIDE 2.5 MG: 2.5 TABLET ORAL at 09:06

## 2025-06-08 RX ADMIN — HEPARIN SODIUM 16 UNITS/KG/HR: 10000 INJECTION, SOLUTION INTRAVENOUS at 05:06

## 2025-06-08 RX ADMIN — HYPROMELLOSE 2910 2 DROP: 5 SOLUTION/ DROPS OPHTHALMIC at 08:06

## 2025-06-08 NOTE — PROGRESS NOTES
[facility]    Cardiology  Progress Note    Patient Name: Robert Lubin  MRN: [id number]  Admission Date: 6/6/2025  Hospital Length of Stay: 2 days  Code Status: No Order   Attending Physician: Toi Bonilla MD   Primary Care Physician: Ankit Khanna Jr., MD  Expected Discharge Date:   Principal Problem:<principal problem not specified>    Subjective:     Brief HPI/Hospital Course: 53 y/o with metastatic renal cell CA, DVT, CKD, CAD present to the ER with c/o chest discomfort and SOB. EKG showed q waves in the inferior leads with 1 mm ST elevation. He is on Eliquis. Primary cardiologist is Dr. Valentino. He had a recent Echo at [facility] with an EF of 58% and no significant valve abnormalities. I have been asked to evaluate the patient for STEMI presentation.    Hospital Course:  6.7.25: s/p LHC. Denying CP, palpitations. PE study pending. Somewhat hypotensive, other VSS. Hospital medicine primary team.   6.8.25: VSS. Labs stable. Renal indices improving somewhat. PE study still pending. Denies CP, SOB, palpitations today.     PMH: Von Hippel-Lindau syndrome, Bilateral renal cell carcinoma, Multiple hemangioblastomas (including intracranial), DVT, CAD. CKD, GERD, JIN, Hypothyroidism  PSH:  T6-9 laminectomy for resection of hemangioblastoma Resection of cerebelllar hemangioblastoma, Coronary angiogram with stent placement, RCA, L4 laminectomy for resection of hemangioblastoma, Left partial nephrectomy , Right partial nephrectomy, VATS x3 with therapeutic wedge resection, Excision of cyst from scrotum  Family History: None  Social History: Denies tobacco, alcohol, or other illicit drug use     Previous Diagnostics:  Echo 6.6.25    Left Ventricle: The left ventricle is normal in size. Increased wall thickness. Mild global hypokinesis present. There is mildly reduced systolic function with a visually estimated ejection fraction of 40 - 45%.    Right Ventricle: The right ventricle  is normal in size Systolic function is normal.    Pulmonary Artery: No pulmonary hypertension.    [facility] 6.6.25  Preprocedure diagnosis-STEMI  Postprocedure diagnosis-Obstructive CAD, no obvious culprit or acute vessel closure  Estimated blood loss-5 cc  Tissue removal-none  Complications-none  Contrast 20 cc  Procedures performed:  1. Ultrasound-guided right radial access  2. Coronary angiography, limited contrast  3. Left ventricular hemodynamics  4. 3 vessel IVUS to conserve contrast use  5. TR band access site hemostasis  Findings:  1. Left main-long, normal  2. Lad-64% calcified mid stenosis by IVUS (MLA 4.8mm2) diagonal 1-99% stenosis  3. LCX-non dominant, Calcified 50% stenosis mid segment, collateral noted to presumed RV marginal, likely jailed from old RCA stent  4. RCA-dominant, Patent mid stent, appears undersized for vessel on IVUS, no thrombus or ISR noted  5. EDP 10  Procedure detail:  Ultrasound-guided right radial access obtained.  Sheath inserted.  Vasodilators and heparin administered.  Tiger catheter used for left ventricular hemodynamics.  Catheter used for selective left coronary angiography.  Catheter kept engaging the conus.  I decided to exchange for a JR4 guide to IVUS and minimize contrast use.  Guide was placed in the RCA.  Wire advanced into the PDA.  IVUS performed.  Final angio was satisfactory.  Guide removed.  EBU guide was placed in the LM.  1 set up shot in Ap caudal performed.  Wire placed in the LAD and IVUS performed.  Wire redirected into the circ and IVUS performed. Catheter was removed and a TR band was utilized for access site hemostasis.   Plan:  1. Serial troponins  2. Echo for RV strain  3. High suspicion for PE.  Will consult Hospitalist to assume care  given co morbidities.  CTA with contrast will further put patient at risk for SHALOM.  They may want to consider VQ scan.  4.  Patient also immunocompromised and at risk for infection and bleeding.  5. Would consider Heparin gtt  instead of lovenox in case bleeding becomes an issue.    6. Discussed plan in detail with patient's wife.  7. Medical MNGt for CAD with asa, heparin.  Would like to add plavix but patient has brain mas per family member, unsure if it is contraindicated in this setting.    Review of Systems   Constitutional: Negative for fever and malaise/fatigue.   Cardiovascular:  Negative for chest pain, dyspnea on exertion, irregular heartbeat, near-syncope, palpitations and syncope.   Respiratory:  Negative for wheezing.    All other systems reviewed and are negative.    Objective:     Vital Signs (Most Recent):  Temp: 97.7 °F (36.5 °C) (06/08/25 0256)  Pulse: 82 (06/08/25 0256)  Resp: 18 (06/08/25 0527)  BP: 102/68 (06/08/25 0256)  SpO2: (!) 91 % (06/08/25 0256) Vital Signs (24h Range):  Temp:  [97.7 °F (36.5 °C)-98.6 °F (37 °C)] 97.7 °F (36.5 °C)  Pulse:  [82-88] 82  Resp:  [18] 18  SpO2:  [91 %-98 %] 91 %  BP: (102-115)/(68-75) 102/68   Weight: 76.2 kg (168 lb)  There is no height or weight on file to calculate BMI.  SpO2: (!) 91 %       Intake/Output Summary (Last 24 hours) at 6/8/2025 0757  Last data filed at 6/7/2025 1359  Gross per 24 hour   Intake 240 ml   Output --   Net 240 ml     Lines/Drains/Airways       Peripheral Intravenous Line  Duration                  Peripheral IV - Single Lumen 06/06/25 1801 18 G Anterior;Distal;Left Upper Arm 1 day         Peripheral IV - Single Lumen 06/06/25 1804 20 G Anterior;Right Forearm 1 day                    Significant Labs:   Chemistries:   Recent Labs   Lab 06/06/25  1756 06/06/25 2021 06/07/25  0742 06/07/25  1350 06/08/25  0431     --  143  --  142   K 4.3  --  4.4  --  4.7     --  110*  --  109*   CO2 25  --  25  --  24   BUN 38.6*  --  35.6*  --  29.0*   CREATININE 2.25*  --  1.99*  --  1.88*   CALCIUM 9.3  --  8.4  --  8.4   PROT 7.0  --  6.0*  --  6.0*   BILITOT 0.5  --  0.5  --  0.6   ALKPHOS 63  --  54  --  53   ALT 18  --  15  --  14   AST 41  --  27  --   27   MG  --   --  1.70  --   --    TROPONINI 2.253* 2.404* 2.347* 4.220*  --         CBC/Anemia Labs: [facility]:    Recent Labs   Lab 06/07/25  0420 06/07/25  1350 06/08/25  0431   WBC 8.32 7.58 7.80   HGB 9.5* 9.8* 10.1*   HCT 29.1* 30.2* 31.9*   * 491* 538*   .8* 108.6* 109.6*   RDW 14.0 13.8 13.6    Recent Labs   Lab 06/07/25  1350   INR 1.6*   APTT 54.6*        Telemetry:  SR, HR 81 BPM    Physical Exam  Vitals reviewed.   Constitutional:       General: He is not in acute distress.     Appearance: Normal appearance. He is ill-appearing.   HENT:      Head: Normocephalic and atraumatic.   Eyes:      Extraocular Movements: Extraocular movements intact.   Neck:      Vascular: No carotid bruit.   Cardiovascular:      Rate and Rhythm: Normal rate and regular rhythm.      Pulses: Normal pulses.      Heart sounds: No murmur heard.  Pulmonary:      Effort: Pulmonary effort is normal. No respiratory distress.   Abdominal:      General: There is no distension.   Musculoskeletal:      Right lower leg: No edema.      Left lower leg: No edema.   Skin:     General: Skin is warm and dry.   Neurological:      Mental Status: He is alert.         Current Schedule Inpatient Medications:   artificial tears  2 drop Both Eyes Q4H While awake    ascorbic acid (vitamin C)  1,000 mg Oral Daily    aspirin  81 mg Oral Daily    calcitRIOL  0.25 mcg Oral Daily    cholecalciferol (vitamin D3)  50,000 Units Oral Daily    methadone  5 mg Oral Q8H    midodrine  2.5 mg Oral TID WM    neomycin-polymyxin-dexamethasone   Left Eye Q6H    polyethylene glycol  17 g Oral BID    senna  8.6 mg Oral Daily     Continuous Infusions:   heparin (porcine) in D5W  0-40 Units/kg/hr Intravenous Continuous 10.7 mL/hr at 06/08/25 0528 14 Units/kg/hr at 06/08/25 0528       Assessment:   STEMI-Inferior ST Changes with Elevated Troponin  Obstructive CAD   - No obvious culprit or acute vessel closure per [facility] 6.6.25  Renal cell CA with mets  DVT-on  Eliquis  Suspicions for PE  CKD      Plan:   Clinically stable from yesterday   Renal indices improving somewhat   Still pending PE study (NM Lung Scan Ventilation Perfusion)  Continue ASA and Statin  Would ideally like Plavix on board, but patient has brain mass per family member, so unsure if it is contraindicated in this setting  Continue other meds per primary team  Labs for today still pending   Daily labs: CBC, CMP, K, Mg  Remain on Tele     [facility] will sign off at this time. Please call or re-consult for any further questions or concerns regarding patient. Thank you.     JEEVAN RodgersC  Cardiology  [facility]

## 2025-06-09 LAB
ALBUMIN SERPL-MCNC: 2 G/DL (ref 3.5–5)
ALBUMIN/GLOB SERPL: 0.7 RATIO (ref 1.1–2)
ALP SERPL-CCNC: 51 UNIT/L (ref 40–150)
ALT SERPL-CCNC: 14 UNIT/L (ref 0–55)
ANION GAP SERPL CALC-SCNC: 16 MMOL/L (ref 8–16)
ANION GAP SERPL CALC-SCNC: 8 MEQ/L
APTT PPP: 56.6 SECONDS (ref 23.2–33.7)
APTT PPP: 58.1 SECONDS (ref 23.2–33.7)
APTT PPP: 64.9 SECONDS (ref 23.2–33.7)
APTT PPP: 65 SECONDS (ref 23.2–33.7)
AST SERPL-CCNC: 27 UNIT/L (ref 11–45)
BASOPHILS # BLD AUTO: 0.06 X10(3)/MCL
BASOPHILS NFR BLD AUTO: 0.8 %
BILIRUB SERPL-MCNC: 0.5 MG/DL
BNP BLD-MCNC: 120.1 PG/ML
BUN SERPL-MCNC: 35.2 MG/DL (ref 8.4–25.7)
BUN SERPL-MCNC: 47 MG/DL (ref 6–30)
CALCIUM SERPL-MCNC: 7.7 MG/DL (ref 8.4–10.2)
CHLORIDE SERPL-SCNC: 101 MMOL/L (ref 95–110)
CHLORIDE SERPL-SCNC: 111 MMOL/L (ref 98–107)
CO2 SERPL-SCNC: 23 MMOL/L (ref 22–29)
CREAT SERPL-MCNC: 1.98 MG/DL (ref 0.72–1.25)
CREAT SERPL-MCNC: 2.2 MG/DL (ref 0.5–1.4)
CREAT/UREA NIT SERPL: 18
EOSINOPHIL # BLD AUTO: 1.04 X10(3)/MCL (ref 0–0.9)
EOSINOPHIL NFR BLD AUTO: 14.4 %
ERYTHROCYTE [DISTWIDTH] IN BLOOD BY AUTOMATED COUNT: 13.3 % (ref 11.5–17)
GFR SERPLBLD CREATININE-BSD FMLA CKD-EPI: 39 ML/MIN/1.73/M2
GLOBULIN SER-MCNC: 2.9 GM/DL (ref 2.4–3.5)
GLUCOSE SERPL-MCNC: 103 MG/DL (ref 70–110)
GLUCOSE SERPL-MCNC: 90 MG/DL (ref 74–100)
HCT VFR BLD AUTO: 33.8 % (ref 42–52)
HCT VFR BLD CALC: 39 %PCV (ref 36–54)
HGB BLD-MCNC: 10.8 G/DL (ref 14–18)
HGB BLD-MCNC: 13 G/DL
IMM GRANULOCYTES # BLD AUTO: 0.02 X10(3)/MCL (ref 0–0.04)
IMM GRANULOCYTES NFR BLD AUTO: 0.3 %
LYMPHOCYTES # BLD AUTO: 1.68 X10(3)/MCL (ref 0.6–4.6)
LYMPHOCYTES NFR BLD AUTO: 23.3 %
MAGNESIUM SERPL-MCNC: 1.77 MG/DL (ref 1.6–2.6)
MCH RBC QN AUTO: 34.6 PG (ref 27–31)
MCHC RBC AUTO-ENTMCNC: 32 G/DL (ref 33–36)
MCV RBC AUTO: 108.3 FL (ref 80–94)
MONOCYTES # BLD AUTO: 0.97 X10(3)/MCL (ref 0.1–1.3)
MONOCYTES NFR BLD AUTO: 13.5 %
NEUTROPHILS # BLD AUTO: 3.43 X10(3)/MCL (ref 2.1–9.2)
NEUTROPHILS NFR BLD AUTO: 47.7 %
NRBC BLD AUTO-RTO: 0 %
PLATELET # BLD AUTO: 571 X10(3)/MCL (ref 130–400)
PMV BLD AUTO: 10.7 FL (ref 7.4–10.4)
POC CARDIAC TROPONIN I: 1.29 NG/ML (ref 0–0.08)
POC IONIZED CALCIUM: 1.18 MMOL/L (ref 1.06–1.42)
POC TCO2 (MEASURED): 29 MMOL/L (ref 23–29)
POCT GLUCOSE: 101 MG/DL (ref 70–110)
POCT GLUCOSE: 102 MG/DL (ref 70–110)
POTASSIUM BLD-SCNC: 4.7 MMOL/L (ref 3.5–5.1)
POTASSIUM SERPL-SCNC: 4.8 MMOL/L (ref 3.5–5.1)
PREALB SERPL-MCNC: 4 MG/DL (ref 18–45)
PROT SERPL-MCNC: 4.9 GM/DL (ref 6.4–8.3)
RBC # BLD AUTO: 3.12 X10(6)/MCL (ref 4.7–6.1)
SAMPLE: ABNORMAL
SAMPLE: ABNORMAL
SODIUM BLD-SCNC: 141 MMOL/L (ref 136–145)
SODIUM SERPL-SCNC: 142 MMOL/L (ref 136–145)
TROPONIN I SERPL-MCNC: 1.69 NG/ML (ref 0–0.04)
WBC # BLD AUTO: 7.2 X10(3)/MCL (ref 4.5–11.5)

## 2025-06-09 PROCEDURE — 85730 THROMBOPLASTIN TIME PARTIAL: CPT | Performed by: INTERNAL MEDICINE

## 2025-06-09 PROCEDURE — 21400001 HC TELEMETRY ROOM

## 2025-06-09 PROCEDURE — 25000003 PHARM REV CODE 250

## 2025-06-09 PROCEDURE — 25000003 PHARM REV CODE 250: Performed by: INTERNAL MEDICINE

## 2025-06-09 PROCEDURE — S0109 METHADONE ORAL 5MG: HCPCS | Performed by: INTERNAL MEDICINE

## 2025-06-09 PROCEDURE — 36415 COLL VENOUS BLD VENIPUNCTURE: CPT | Performed by: INTERNAL MEDICINE

## 2025-06-09 PROCEDURE — 63600175 PHARM REV CODE 636 W HCPCS: Performed by: INTERNAL MEDICINE

## 2025-06-09 PROCEDURE — 85025 COMPLETE CBC W/AUTO DIFF WBC: CPT | Performed by: INTERNAL MEDICINE

## 2025-06-09 RX ORDER — SENNOSIDES 8.6 MG/1
8.6 TABLET ORAL 2 TIMES DAILY
Status: DISCONTINUED | OUTPATIENT
Start: 2025-06-09 | End: 2025-06-12 | Stop reason: HOSPADM

## 2025-06-09 RX ORDER — POLYETHYLENE GLYCOL 3350 17 G/17G
17 POWDER, FOR SOLUTION ORAL DAILY
Status: DISCONTINUED | OUTPATIENT
Start: 2025-06-10 | End: 2025-06-12 | Stop reason: HOSPADM

## 2025-06-09 RX ORDER — ASPIRIN 81 MG/1
81 TABLET ORAL ONCE
Status: COMPLETED | OUTPATIENT
Start: 2025-06-09 | End: 2025-06-09

## 2025-06-09 RX ORDER — DRONABINOL 2.5 MG/1
5 CAPSULE ORAL 2 TIMES DAILY
Status: DISCONTINUED | OUTPATIENT
Start: 2025-06-09 | End: 2025-06-09

## 2025-06-09 RX ORDER — GABAPENTIN 300 MG/1
300 CAPSULE ORAL 2 TIMES DAILY
Status: DISCONTINUED | OUTPATIENT
Start: 2025-06-09 | End: 2025-06-12 | Stop reason: HOSPADM

## 2025-06-09 RX ORDER — ATORVASTATIN CALCIUM 40 MG/1
40 TABLET, FILM COATED ORAL NIGHTLY
Status: DISCONTINUED | OUTPATIENT
Start: 2025-06-09 | End: 2025-06-10

## 2025-06-09 RX ORDER — LEVOTHYROXINE SODIUM 25 UG/1
25 TABLET ORAL ONCE
Status: COMPLETED | OUTPATIENT
Start: 2025-06-09 | End: 2025-06-09

## 2025-06-09 RX ORDER — BRIMONIDINE TARTRATE 1.5 MG/ML
1 SOLUTION/ DROPS OPHTHALMIC 2 TIMES DAILY
Status: DISCONTINUED | OUTPATIENT
Start: 2025-06-09 | End: 2025-06-12 | Stop reason: HOSPADM

## 2025-06-09 RX ORDER — ASPIRIN 81 MG/1
81 TABLET ORAL DAILY
Status: DISCONTINUED | OUTPATIENT
Start: 2025-06-10 | End: 2025-06-12 | Stop reason: HOSPADM

## 2025-06-09 RX ADMIN — CALCITRIOL CAPSULES 0.25 MCG 0.25 MCG: 0.25 CAPSULE ORAL at 09:06

## 2025-06-09 RX ADMIN — MIDODRINE HYDROCHLORIDE 2.5 MG: 2.5 TABLET ORAL at 09:06

## 2025-06-09 RX ADMIN — METHADONE HYDROCHLORIDE 5 MG: 5 SOLUTION ORAL at 09:06

## 2025-06-09 RX ADMIN — NEOMYCIN AND POLYMYXIN B SULFATES AND DEXAMETHASONE: 3.5; 10000; 1 OINTMENT OPHTHALMIC at 09:06

## 2025-06-09 RX ADMIN — HYPROMELLOSE 2910 2 DROP: 5 SOLUTION/ DROPS OPHTHALMIC at 09:06

## 2025-06-09 RX ADMIN — BRIMONIDINE TARTRATE 1 DROP: 1.5 SOLUTION OPHTHALMIC at 10:06

## 2025-06-09 RX ADMIN — GABAPENTIN 300 MG: 300 CAPSULE ORAL at 10:06

## 2025-06-09 RX ADMIN — ASPIRIN 81 MG: 81 TABLET, DELAYED RELEASE ORAL at 09:06

## 2025-06-09 RX ADMIN — NEOMYCIN AND POLYMYXIN B SULFATES AND DEXAMETHASONE: 3.5; 10000; 1 OINTMENT OPHTHALMIC at 11:06

## 2025-06-09 RX ADMIN — SENNOSIDES 8.6 MG: 8.6 TABLET, FILM COATED ORAL at 09:06

## 2025-06-09 RX ADMIN — HEPARIN SODIUM 17 UNITS/KG/HR: 10000 INJECTION, SOLUTION INTRAVENOUS at 12:06

## 2025-06-09 RX ADMIN — POLYETHYLENE GLYCOL 3350 17 G: 17 POWDER, FOR SOLUTION ORAL at 09:06

## 2025-06-09 RX ADMIN — METHADONE HYDROCHLORIDE 5 MG: 5 SOLUTION ORAL at 05:06

## 2025-06-09 RX ADMIN — GABAPENTIN 300 MG: 300 CAPSULE ORAL at 09:06

## 2025-06-09 RX ADMIN — Medication 1000 MG: at 09:06

## 2025-06-09 RX ADMIN — METHADONE HYDROCHLORIDE 5 MG: 5 SOLUTION ORAL at 03:06

## 2025-06-09 RX ADMIN — LEVOTHYROXINE SODIUM 25 MCG: 0.03 TABLET ORAL at 10:06

## 2025-06-09 RX ADMIN — ASPIRIN 81 MG: 81 TABLET, COATED ORAL at 10:06

## 2025-06-09 RX ADMIN — NEOMYCIN AND POLYMYXIN B SULFATES AND DEXAMETHASONE: 3.5; 10000; 1 OINTMENT OPHTHALMIC at 05:06

## 2025-06-09 RX ADMIN — MIDODRINE HYDROCHLORIDE 2.5 MG: 2.5 TABLET ORAL at 05:06

## 2025-06-09 RX ADMIN — HYPROMELLOSE 2910 2 DROP: 5 SOLUTION/ DROPS OPHTHALMIC at 05:06

## 2025-06-09 RX ADMIN — MIDODRINE HYDROCHLORIDE 2.5 MG: 2.5 TABLET ORAL at 11:06

## 2025-06-09 RX ADMIN — HYPROMELLOSE 2910 2 DROP: 5 SOLUTION/ DROPS OPHTHALMIC at 03:06

## 2025-06-09 NOTE — PLAN OF CARE
Problem: Adult Inpatient Plan of Care  Goal: Plan of Care Review  6/8/2025 2323 by Dagmar Rivera RN  Outcome: Progressing  6/8/2025 2323 by Dagmar Rivera RN  Outcome: Progressing  Goal: Patient-Specific Goal (Individualized)  6/8/2025 2323 by Dagmar Rivera RN  Outcome: Progressing  6/8/2025 2323 by Dagmar Rivera RN  Outcome: Progressing  Goal: Absence of Hospital-Acquired Illness or Injury  6/8/2025 2323 by Dagmar Rivera RN  Outcome: Progressing  6/8/2025 2323 by Dagmar Rivera RN  Outcome: Progressing  Goal: Optimal Comfort and Wellbeing  6/8/2025 2323 by Dagmar Rivera RN  Outcome: Progressing  6/8/2025 2323 by Dagmar Rivera RN  Outcome: Progressing  Goal: Readiness for Transition of Care  6/8/2025 2323 by Dagmar Rivera RN  Outcome: Progressing  6/8/2025 2323 by Dagmar Rivera RN  Outcome: Progressing     Problem: Wound  Goal: Optimal Coping  6/8/2025 2323 by Dagmar Rivera RN  Outcome: Progressing  6/8/2025 2323 by Dagmar Rivera RN  Outcome: Progressing  Goal: Optimal Functional Ability  6/8/2025 2323 by Dagmar Rivera RN  Outcome: Progressing  6/8/2025 2323 by Dagmar Rivera RN  Outcome: Progressing  Goal: Absence of Infection Signs and Symptoms  6/8/2025 2323 by Dagmar Rivera RN  Outcome: Progressing  6/8/2025 2323 by Dagmar Rivera RN  Outcome: Progressing  Goal: Improved Oral Intake  6/8/2025 2323 by Dagmar Rivera RN  Outcome: Progressing  6/8/2025 2323 by Dagmar Rivera RN  Outcome: Progressing  Goal: Optimal Pain Control and Function  6/8/2025 2323 by Dagmar Rivera RN  Outcome: Progressing  6/8/2025 2323 by Dagmar Rivera RN  Outcome: Progressing  Goal: Skin Health and Integrity  6/8/2025 2323 by Dagmar Rivera RN  Outcome: Progressing  6/8/2025 2323 by Dagmar Rivera RN  Outcome: Progressing  Goal: Optimal Wound Healing  6/8/2025 2323 by Dagmar Rivera, RN  Outcome: Progressing  6/8/2025 2323 by Dagmar Rivera, RN  Outcome: Progressing     Problem: Fall Injury Risk  Goal: Absence of Fall and  Fall-Related Injury  6/8/2025 2323 by Dagmar Rivera, RN  Outcome: Progressing  6/8/2025 2323 by Dagmar Rivera, RN  Outcome: Progressing     Problem: Skin Injury Risk Increased  Goal: Skin Health and Integrity  6/8/2025 2323 by Dagmar Rivera, RN  Outcome: Progressing  6/8/2025 2323 by Dagmar Rivera, RN  Outcome: Progressing

## 2025-06-09 NOTE — PROGRESS NOTES
Hospital Medicine  Progress Note    Patient Name: Robret Lubin  MRN: [id number]  Status: IP- Inpatient   Admission Date: 6/6/2025  Length of Stay: 3  Date of Service: 06/09/2025       CC: hospital follow-up for chest pain       SUBJECTIVE    54 y.o. 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  EKG showed q waves in the inferior leads with 1 mm ST elevation.  Initial troponin 2.2 (subsequent 2.4->2.3).  Additional labs included mostly unremarkable CBC with exception of mild anemia, white count normal.  Chemistries consistent with his baseline CKD IIIB.  He was admitted to Cardiology services 6/6 and underwent LHC, with caution to contrast load; findings however noted CAD, though right heart strain noted, and there was concern for PE.  Echo showed an EF 40-45% with mild global hypokinesis  (recent Echo at [facility] with an EF of 58%).  Cardiology concerned for PE; hospital medicine consulted for further input.    Recent history notes patient was due for Opdivo last week but was experiencing hypotension. He was then sent to the ED at [facility] for a blood pressures in the 70's/40's.  He was then diagnosed with a RLE DVT.  Wife reports that a V/Q scan at that time was negative.  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  They note his blood pressures had been irregular since.  He did follow up with his Cardiologist once back here in [place], and was started on sodium supplements along with Midodrine.  But both his oral and IV chemo remain on hold at this time.    Admitted to  services.  Continued medical therapy including ASA and heparin infusion for STEMI.  Remained HDS stable on midodrine,  H&H stable.  Underwent VQ scan for PE, which was negative.    Today: Patient seen and examined at bedside, and chart reviewed.  Remains stable including H&H.  He reports feeling a little better today.   Appetite and oral intake remain poor however.  I was able to contact and discuss his care with his Oncologist Dr Christie at [facility].      MEDICATIONS   Scheduled   artificial tears  2 drop Both Eyes Q4H While awake    ascorbic acid (vitamin C)  1,000 mg Oral Daily    brimonidine 0.15 % OPTH DROP  1 drop Both Eyes BID    calcitRIOL  0.25 mcg Oral Daily    cholecalciferol (vitamin D3)  50,000 Units Oral Daily    droNABinol  5 mg Oral BID    gabapentin  300 mg Oral BID    methadone  5 mg Oral Q8H    midodrine  2.5 mg Oral TID WM    neomycin-polymyxin-dexamethasone   Left Eye Q6H    polyethylene glycol  17 g Oral BID    senna  8.6 mg Oral Daily     Continuous Infusions   heparin (porcine) in D5W  0-40 Units/kg/hr Intravenous Continuous 13 mL/hr at 06/09/25 1203 17 Units/kg/hr at 06/09/25 1203       PHYSICAL EXAM   VITALS: T 98.6 °F (37 °C)   /74   P 93   RR 14   O2 (!) 94 %    GENERAL: Awake, NAD  LUNGS: CTA anteriorly  CVS: RRR, S1S2 positive  GI/: Soft, NT/ND, bowel sounds positive.  EXTREMITIES: Peripheral pulses 2+  NEURO: AAOx3  PSYCH: Cooperative      LABS   CBC  Recent Labs     06/08/25  0431 06/09/25  0314   WBC 7.80 7.20   RBC 2.91* 3.12*   HGB 10.1* 10.8*   HCT 31.9* 33.8*   .6* 108.3*   MCH 34.7* 34.6*   MCHC 31.7* 32.0*   RDW 13.6 13.3   * 571*     CHEM  Recent Labs     06/07/25  0420 06/07/25  0742 06/08/25  0431    143 142   K 4.8 4.4 4.7   CO2 23 25 24   BUN 35.2* 35.6* 29.0*   CREATININE 1.98* 1.99* 1.88*   CALCIUM 7.7* 8.4 8.4   MG 1.77 1.70  --    ALBUMIN 2.0* 2.0* 1.9*   GLOBULIN 2.9 4.0* 4.1*   ALKPHOS 51 54 53   ALT 14 15 14   AST 27 27 27   BILITOT 0.5 0.5 0.6         MICROBIOLOGY     Microbiology Results (last 7 days)       Procedure Component Value Units Date/Time     Blood Culture #1 **CANNOT BE ORDERED STAT** [[id number]]  (Normal) Collected: 06/06/25 2021    Order Status: Completed Specimen: Blood Updated: 06/08/25 2201     Blood Culture No Growth At 48 Hours    Blood Culture #2 **CANNOT BE ORDERED STAT** [[id number]]  (Normal) Collected: 06/06/25 2021    Order Status: Completed Specimen: Blood Updated: 06/08/25 2102     Blood Culture No Growth At 48 Hours              DIAGNOSTICS   NM Lung Scan Ventilation Perfusion  Exam quality: adequate for interpretation  Perfusion: No large or multifocal mismatched segmental/subsegmental perfusion defects are appreciated.  Ventilation: Radiotracer activity is noted to be homogeneous and symmetric within the lungs during ventilation acquisitions.  IMPRESSION  Normal V/Q scan (no evidence of PE).  Electronically signed by: Osmany Montoya  Date:    06/08/2025  Time:    15:39      ASSESSMENT   STEMI  RLE DVT  CKD IIIB  Ongoing hypotension  h/o metastatic renal cell carcinoma with liver/lung invovlement  h/o VHL syndrome with multiple hemangioblastomas, including intracranial  Constipation, on opioid therapy  Severe protein-calorie malnutrition    PLAN   Continue heparin infusion for now  Will continue medical therapy for STEMI for now with ASA/statin  Further GDMT limited by blood pressures  Continue midodrine. monitor pressures closely, will check AM cortisol and aldosterone  Heme/Onc consult placed as well  Otherwise continue current management and monitoring in the interim        Prophylaxis: Heparin as above        Toi Bonilla MD  [facility] Medicine

## 2025-06-09 NOTE — PROGRESS NOTES
Hospital Medicine  Progress Note    Patient Name: Robert Lubin  MRN: [id number]  Status: IP- Inpatient   Admission Date: 6/6/2025  Length of Stay: 2  Date of Service: 06/08/2025       CC: hospital follow-up for chest pain       SUBJECTIVE    54 y.o. 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  EKG showed q waves in the inferior leads with 1 mm ST elevation.  Initial troponin 2.2 (subsequent 2.4->2.3).  Additional labs included mostly unremarkable CBC with exception of mild anemia, white count normal.  Chemistries consistent with his baseline CKD IIIB.  He was admitted to Cardiology services 6/6 and underwent LHC, with caution to contrast load; findings however noted CAD, though right heart strain noted, and there was concern for PE.  Echo showed an EF 40-45% with mild global hypokinesis  (recent Echo at [facility] with an EF of 58%).  Cardiology concerned for PE; hospital medicine consulted for further input.    Recent history notes patient was due for Opdivo last week but was experiencing hypotension. He was then sent to the ED at [facility] for a blood pressures in the 70's/40's.  He was then diagnosed with a RLE DVT.  Wife reports that a V/Q scan at that time was negative.  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  They note his blood pressures had been irregular since.  He did follow up with his Cardiologist once back here in [place], and was started on sodium supplements along with Midodrine.  But both his oral and IV chemo remain on hold at this time.    Today: Patient seen and examined at bedside, and chart reviewed.  Feels about the same, not much changed.  VQ scan  negative.      MEDICATIONS   Scheduled   artificial tears  2 drop Both Eyes Q4H While awake    ascorbic acid (vitamin C)  1,000 mg Oral Daily    aspirin  81 mg Oral Daily    calcitRIOL  0.25 mcg Oral Daily    cholecalciferol (vitamin D3)  50,000 Units Oral Daily    methadone  5 mg Oral Q8H    midodrine  2.5 mg Oral TID WM    neomycin-polymyxin-dexamethasone   Left Eye Q6H    polyethylene glycol  17 g Oral BID    senna  8.6 mg Oral Daily     Continuous Infusions   heparin (porcine) in D5W  0-40 Units/kg/hr Intravenous Continuous 12.2 mL/hr at 06/08/25 1736 16 Units/kg/hr at 06/08/25 1736         PHYSICAL EXAM   VITALS: T 98.6 °F (37 °C)   BP 99/68   P 96   RR 19   O2 (!) 93 %    GENERAL: Awake, NAD  LUNGS: CTA anteriorly  CVS: RRR, S1S2 positive  GI/: Soft, NT/ND, bowel sounds positive.  EXTREMITIES: Peripheral pulses 2+  NEURO: AAOx3  PSYCH: Cooperative      LABS   CBC  Recent Labs     06/07/25  1350 06/08/25  0431   WBC 7.58 7.80   RBC 2.78* 2.91*   HGB 9.8* 10.1*   HCT 30.2* 31.9*   .6* 109.6*   MCH 35.3* 34.7*   MCHC 32.5* 31.7*   RDW 13.8 13.6   * 538*     CHEM  Recent Labs     06/07/25  0742 06/08/25  0431    142   K 4.4 4.7   CO2 25 24   BUN 35.6* 29.0*   CREATININE 1.99* 1.88*   CALCIUM 8.4 8.4   MG 1.70  --    ALBUMIN 2.0* 1.9*   GLOBULIN 4.0* 4.1*   ALKPHOS 54 53   ALT 15 14   AST 27 27   BILITOT 0.5 0.6         MICROBIOLOGY     Microbiology Results (last 7 days)       Procedure Component Value Units Date/Time    Blood Culture #1 **CANNOT BE ORDERED STAT** [[id number]]  (Normal) Collected: 06/06/25 2021    Order Status: Completed Specimen: Blood Updated: 06/08/25 2201     Blood Culture No Growth At 48 Hours    Blood Culture #2 **CANNOT BE ORDERED STAT** [[id number]]  (Normal) Collected: 06/06/25 2021    Order Status: Completed Specimen: Blood Updated: 06/08/25 2102     Blood Culture No Growth At 48 Hours              DIAGNOSTICS   NM Lung Scan Ventilation Perfusion  Exam quality:  adequate for interpretation  Perfusion: No large or multifocal mismatched segmental/subsegmental perfusion defects are appreciated.  Ventilation: Radiotracer activity is noted to be homogeneous and symmetric within the lungs during ventilation acquisitions.  IMPRESSION  Normal V/Q scan (no evidence of PE).  Electronically signed by: Osmany Montoya  Date:    06/08/2025  Time:    15:39        ASSESSMENT   STEMI  RLE DVT  CKD IIIB  Ongoing hypotension  h/o metastatic renal cell carcinoma with liver/lung invovlement  h/o VHL syndrome with multiple hemangioblastomas, including intracranial  Constipation, on opioid therapy    PLAN   Continue cautious heparin infusion VQ scan negative, H&H stable  Continue with ASA/statin, further GDMT limited by blood pressures  Continue midodrine. monitor pressures closely  Will consult Heme/Onc  Otherwise continue current management and monitoring in the interim        Prophylaxis: Heparin as above        Toi Bonilla MD  [facility] Medicine

## 2025-06-09 NOTE — PLAN OF CARE
06/09/25 1524   Discharge Assessment   Assessment Type Discharge Planning Assessment   Confirmed/corrected address, phone number and insurance Yes   Confirmed Demographics Correct on Facesheet   Source of Information patient;family  (pt's wife, Donna)   Communicated MATTHEW with patient/caregiver Date not available/Unable to determine   People in Home spouse  (pt lives with his wife in a single story home with a thresh hold step to enter the home)   Name(s) of People in Home Donna, wife   Do you expect to return to your current living situation? Yes   Do you have help at home or someone to help you manage your care at home? Yes   Who are your caregiver(s) and their phone number(s)? wife will be able to assist pt   Prior to hospitilization cognitive status: Unable to Assess   Current cognitive status: Alert/Oriented   Walking or Climbing Stairs Difficulty no   Dressing/Bathing Difficulty no   Home Layout Able to live on 1st floor   Equipment Currently Used at Home none;other (see comments)  (has a RW and cane)   Readmission within 30 days? No   Patient currently being followed by outpatient case management? No   Do you currently have service(s) that help you manage your care at home? No   Do you take prescription medications? Yes   Do you have prescription coverage? Yes   Who is going to help you get home at discharge? wife, Donna   How do you get to doctors appointments? car, drives self   Are you on dialysis? No   Discharge Plan A Home with family   Discharge Plan B Home   DME Needed Upon Discharge  none   Discharge Plan discussed with: Patient;Spouse/sig other   Name(s) and Number(s) Donna--[phone]   Transition of Care Barriers None   Housing Stability   In the last 12 months, was there a time when you were not able to pay the mortgage or rent on time? N   Transportation Needs   In the past 12 months, has lack of transportation kept you from medical appointments or from getting medications? no   Food Insecurity    Within the past 12 months, you worried that your food would run out before you got the money to buy more. Never true   Utilities   In the past 12 months has the electric, gas, oil, or water company threatened to shut off services in your home? No     Pt's PCP is Dr Ankit Khanna who is located in [place]. Pt's  is his wife, Donna ([phone]). Pt has never had HH services. Pt uses [email or web address]. Pt does drive. CM to follow

## 2025-06-10 LAB
ALBUMIN SERPL-MCNC: 2 G/DL (ref 3.5–5)
ALBUMIN/GLOB SERPL: 0.5 RATIO (ref 1.1–2)
ALP SERPL-CCNC: 55 UNIT/L (ref 40–150)
ALT SERPL-CCNC: 13 UNIT/L (ref 0–55)
ANION GAP SERPL CALC-SCNC: 8 MEQ/L
APTT PPP: 72.1 SECONDS (ref 23.2–33.7)
AST SERPL-CCNC: 25 UNIT/L (ref 11–45)
BASOPHILS # BLD AUTO: 0.06 X10(3)/MCL
BASOPHILS NFR BLD AUTO: 0.9 %
BILIRUB SERPL-MCNC: 0.4 MG/DL
BUN SERPL-MCNC: 27.7 MG/DL (ref 8.4–25.7)
CALCIUM SERPL-MCNC: 9.5 MG/DL (ref 8.4–10.2)
CHLORIDE SERPL-SCNC: 105 MMOL/L (ref 98–107)
CO2 SERPL-SCNC: 29 MMOL/L (ref 22–29)
CORTIS 1H P CHAL SERPL-SCNC: 14 UG/DL
CORTIS SERPL-SCNC: 1.9 UG/DL
CORTIS SERPL-SCNC: 2.1 UG/DL
CORTIS SERPL-SCNC: 2.3 UG/DL
CREAT SERPL-MCNC: 2.1 MG/DL (ref 0.72–1.25)
CREAT/UREA NIT SERPL: 13
EOSINOPHIL # BLD AUTO: 1.19 X10(3)/MCL (ref 0–0.9)
EOSINOPHIL NFR BLD AUTO: 16.9 %
ERYTHROCYTE [DISTWIDTH] IN BLOOD BY AUTOMATED COUNT: 13.5 % (ref 11.5–17)
GFR SERPLBLD CREATININE-BSD FMLA CKD-EPI: 36 ML/MIN/1.73/M2
GLOBULIN SER-MCNC: 4.1 GM/DL (ref 2.4–3.5)
GLUCOSE SERPL-MCNC: 103 MG/DL (ref 74–100)
HCT VFR BLD AUTO: 30.8 % (ref 42–52)
HGB BLD-MCNC: 10.1 G/DL (ref 14–18)
IMM GRANULOCYTES # BLD AUTO: 0.03 X10(3)/MCL (ref 0–0.04)
IMM GRANULOCYTES NFR BLD AUTO: 0.4 %
LYMPHOCYTES # BLD AUTO: 1.91 X10(3)/MCL (ref 0.6–4.6)
LYMPHOCYTES NFR BLD AUTO: 27.2 %
MCH RBC QN AUTO: 35.2 PG (ref 27–31)
MCHC RBC AUTO-ENTMCNC: 32.8 G/DL (ref 33–36)
MCV RBC AUTO: 107.3 FL (ref 80–94)
MONOCYTES # BLD AUTO: 1.03 X10(3)/MCL (ref 0.1–1.3)
MONOCYTES NFR BLD AUTO: 14.7 %
NEUTROPHILS # BLD AUTO: 2.81 X10(3)/MCL (ref 2.1–9.2)
NEUTROPHILS NFR BLD AUTO: 39.9 %
NRBC BLD AUTO-RTO: 0 %
PLATELET # BLD AUTO: 615 X10(3)/MCL (ref 130–400)
PMV BLD AUTO: 11 FL (ref 7.4–10.4)
POCT GLUCOSE: 117 MG/DL (ref 70–110)
POCT GLUCOSE: 119 MG/DL (ref 70–110)
POCT GLUCOSE: 132 MG/DL (ref 70–110)
POCT GLUCOSE: 81 MG/DL (ref 70–110)
POTASSIUM SERPL-SCNC: 5.2 MMOL/L (ref 3.5–5.1)
PROT SERPL-MCNC: 6.1 GM/DL (ref 6.4–8.3)
RBC # BLD AUTO: 2.87 X10(6)/MCL (ref 4.7–6.1)
SODIUM SERPL-SCNC: 142 MMOL/L (ref 136–145)
TSH SERPL-ACNC: 4.23 UIU/ML (ref 0.35–4.94)
WBC # BLD AUTO: 7.03 X10(3)/MCL (ref 4.5–11.5)

## 2025-06-10 PROCEDURE — 25000003 PHARM REV CODE 250: Performed by: INTERNAL MEDICINE

## 2025-06-10 PROCEDURE — 85730 THROMBOPLASTIN TIME PARTIAL: CPT | Performed by: INTERNAL MEDICINE

## 2025-06-10 PROCEDURE — 85025 COMPLETE CBC W/AUTO DIFF WBC: CPT | Performed by: INTERNAL MEDICINE

## 2025-06-10 PROCEDURE — 84443 ASSAY THYROID STIM HORMONE: CPT | Performed by: INTERNAL MEDICINE

## 2025-06-10 PROCEDURE — 82088 ASSAY OF ALDOSTERONE: CPT | Performed by: INTERNAL MEDICINE

## 2025-06-10 PROCEDURE — 36415 COLL VENOUS BLD VENIPUNCTURE: CPT | Performed by: INTERNAL MEDICINE

## 2025-06-10 PROCEDURE — 80053 COMPREHEN METABOLIC PANEL: CPT | Performed by: INTERNAL MEDICINE

## 2025-06-10 PROCEDURE — 21400001 HC TELEMETRY ROOM

## 2025-06-10 PROCEDURE — 82533 TOTAL CORTISOL: CPT | Performed by: INTERNAL MEDICINE

## 2025-06-10 PROCEDURE — S0109 METHADONE ORAL 5MG: HCPCS | Performed by: INTERNAL MEDICINE

## 2025-06-10 PROCEDURE — 63600175 PHARM REV CODE 636 W HCPCS: Performed by: INTERNAL MEDICINE

## 2025-06-10 PROCEDURE — 82024 ASSAY OF ACTH: CPT | Performed by: INTERNAL MEDICINE

## 2025-06-10 PROCEDURE — 25000003 PHARM REV CODE 250

## 2025-06-10 RX ORDER — LEVOTHYROXINE SODIUM 25 UG/1
25 TABLET ORAL
Status: DISCONTINUED | OUTPATIENT
Start: 2025-06-11 | End: 2025-06-12 | Stop reason: HOSPADM

## 2025-06-10 RX ORDER — COSYNTROPIN 0.25 MG/ML
0.25 INJECTION, POWDER, FOR SOLUTION INTRAMUSCULAR; INTRAVENOUS ONCE
Status: COMPLETED | OUTPATIENT
Start: 2025-06-10 | End: 2025-06-10

## 2025-06-10 RX ADMIN — HYPROMELLOSE 2910 2 DROP: 5 SOLUTION/ DROPS OPHTHALMIC at 01:06

## 2025-06-10 RX ADMIN — SENNOSIDES 8.6 MG: 8.6 TABLET, FILM COATED ORAL at 08:06

## 2025-06-10 RX ADMIN — HYPROMELLOSE 2910 2 DROP: 5 SOLUTION/ DROPS OPHTHALMIC at 09:06

## 2025-06-10 RX ADMIN — METHADONE HYDROCHLORIDE 5 MG: 5 SOLUTION ORAL at 09:06

## 2025-06-10 RX ADMIN — APIXABAN 5 MG: 5 TABLET, FILM COATED ORAL at 09:06

## 2025-06-10 RX ADMIN — ASPIRIN 81 MG: 81 TABLET, COATED ORAL at 08:06

## 2025-06-10 RX ADMIN — METHADONE HYDROCHLORIDE 5 MG: 5 SOLUTION ORAL at 05:06

## 2025-06-10 RX ADMIN — MIDODRINE HYDROCHLORIDE 2.5 MG: 2.5 TABLET ORAL at 11:06

## 2025-06-10 RX ADMIN — Medication 1000 MG: at 08:06

## 2025-06-10 RX ADMIN — SENNOSIDES 8.6 MG: 8.6 TABLET, FILM COATED ORAL at 09:06

## 2025-06-10 RX ADMIN — NEOMYCIN AND POLYMYXIN B SULFATES AND DEXAMETHASONE: 3.5; 10000; 1 OINTMENT OPHTHALMIC at 11:06

## 2025-06-10 RX ADMIN — METHADONE HYDROCHLORIDE 5 MG: 5 SOLUTION ORAL at 01:06

## 2025-06-10 RX ADMIN — GABAPENTIN 300 MG: 300 CAPSULE ORAL at 08:06

## 2025-06-10 RX ADMIN — NEOMYCIN AND POLYMYXIN B SULFATES AND DEXAMETHASONE: 3.5; 10000; 1 OINTMENT OPHTHALMIC at 05:06

## 2025-06-10 RX ADMIN — HEPARIN SODIUM 17 UNITS/KG/HR: 10000 INJECTION, SOLUTION INTRAVENOUS at 06:06

## 2025-06-10 RX ADMIN — MIDODRINE HYDROCHLORIDE 2.5 MG: 2.5 TABLET ORAL at 05:06

## 2025-06-10 RX ADMIN — CALCITRIOL CAPSULES 0.25 MCG 0.25 MCG: 0.25 CAPSULE ORAL at 08:06

## 2025-06-10 RX ADMIN — POLYETHYLENE GLYCOL 3350 17 G: 17 POWDER, FOR SOLUTION ORAL at 08:06

## 2025-06-10 RX ADMIN — GABAPENTIN 300 MG: 300 CAPSULE ORAL at 09:06

## 2025-06-10 RX ADMIN — HYPROMELLOSE 2910 2 DROP: 5 SOLUTION/ DROPS OPHTHALMIC at 05:06

## 2025-06-10 RX ADMIN — BRIMONIDINE TARTRATE 1 DROP: 1.5 SOLUTION OPHTHALMIC at 08:06

## 2025-06-10 RX ADMIN — COSYNTROPIN 0.25 MG: 0.25 INJECTION, POWDER, LYOPHILIZED, FOR SOLUTION INTRAMUSCULAR; INTRAVENOUS at 03:06

## 2025-06-10 RX ADMIN — MIDODRINE HYDROCHLORIDE 2.5 MG: 2.5 TABLET ORAL at 08:06

## 2025-06-10 NOTE — PLAN OF CARE
Problem: Adult Inpatient Plan of Care  Goal: Plan of Care Review  6/9/2025 2259 by Dagmar Rivera RN  Outcome: Progressing  6/9/2025 2259 by Dagmar Rivera RN  Outcome: Progressing  Goal: Patient-Specific Goal (Individualized)  6/9/2025 2259 by Dagmar Rivera RN  Outcome: Progressing  6/9/2025 2259 by Dagmar Rivera RN  Outcome: Progressing  Goal: Absence of Hospital-Acquired Illness or Injury  6/9/2025 2259 by Dagmar Rivera RN  Outcome: Progressing  6/9/2025 2259 by Dagmar Rivera RN  Outcome: Progressing  Goal: Optimal Comfort and Wellbeing  6/9/2025 2259 by Dagmar Rivera RN  Outcome: Progressing  6/9/2025 2259 by Dagmar Rivera RN  Outcome: Progressing  Goal: Readiness for Transition of Care  6/9/2025 2259 by Dagmar Rivera RN  Outcome: Progressing  6/9/2025 2259 by Dagmar Rivera RN  Outcome: Progressing     Problem: Wound  Goal: Optimal Coping  6/9/2025 2259 by Dagmar Rivera RN  Outcome: Progressing  6/9/2025 2259 by Dagmar Rivera RN  Outcome: Progressing  Goal: Optimal Functional Ability  6/9/2025 2259 by Dagmar Rivera RN  Outcome: Progressing  6/9/2025 2259 by Dagmar Rivera RN  Outcome: Progressing  Goal: Absence of Infection Signs and Symptoms  6/9/2025 2259 by Dagmar Rivera RN  Outcome: Progressing  6/9/2025 2259 by Dagmar Rivera RN  Outcome: Progressing  Goal: Improved Oral Intake  6/9/2025 2259 by Dagmar Rivera RN  Outcome: Progressing  6/9/2025 2259 by Dagmar Rivera RN  Outcome: Progressing  Goal: Optimal Pain Control and Function  6/9/2025 2259 by Dagmar Rivera RN  Outcome: Progressing  6/9/2025 2259 by Dagmar Rivera RN  Outcome: Progressing  Goal: Skin Health and Integrity  6/9/2025 2259 by Dagmar Rivera RN  Outcome: Progressing  6/9/2025 2259 by Dagmar Rivera RN  Outcome: Progressing  Goal: Optimal Wound Healing  6/9/2025 2259 by Dagmar Rivera, RN  Outcome: Progressing  6/9/2025 2259 by Dagmar Rivera, RN  Outcome: Progressing     Problem: Fall Injury Risk  Goal: Absence of Fall and  Fall-Related Injury  6/9/2025 2259 by Dagmar Rivera, RN  Outcome: Progressing  6/9/2025 2259 by Dagmar Rivera, RN  Outcome: Progressing     Problem: Skin Injury Risk Increased  Goal: Skin Health and Integrity  6/9/2025 2259 by Dagmar Rivera, RN  Outcome: Progressing  6/9/2025 2259 by Dagmar Rivera, RN  Outcome: Progressing

## 2025-06-10 NOTE — PROGRESS NOTES
Hospital Medicine  Progress Note    Patient Name: Robert Lubin  MRN: [id number]  Status: IP- Inpatient   Admission Date: 6/6/2025  Length of Stay: 4  Date of Service: 06/10/2025       CC: hospital follow-up for chest pain       SUBJECTIVE    54 y.o. 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  EKG showed q waves in the inferior leads with 1 mm ST elevation.  Initial troponin 2.2 (subsequent 2.4->2.3).  Additional labs included mostly unremarkable CBC with exception of mild anemia, white count normal.  Chemistries consistent with his baseline CKD IIIB.  He was admitted to Cardiology services 6/6 and underwent LHC, with caution to contrast load; findings however noted CAD, though right heart strain noted, and there was concern for PE.  Echo showed an EF 40-45% with mild global hypokinesis  (recent Echo at [facility] with an EF of 58%).  Cardiology concerned for PE; hospital medicine consulted for further input.    Recent history notes patient was due for Opdivo last week but was experiencing hypotension. He was then sent to the ED at [facility] for a blood pressures in the 70's/40's.  He was then diagnosed with a RLE DVT.  Wife reports that a V/Q scan at that time was negative.  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  They note his blood pressures had been irregular since.  He did follow up with his Cardiologist once back here in [place], and was started on sodium supplements along with Midodrine.  But both his oral and IV chemo remain on hold at this time.    Admitted to  services.  Continued medical therapy including ASA and heparin infusion for STEMI.  Remained HDS stable on midodrine,  H&H stable.  Underwent VQ scan for PE, which was negative.  Patient will no chest pain, holding off on stenting due to concern over DAPT + AC, and concern for renal dysfunction and contrast. Continued on medical management for CAD.  Discussed his care with his Oncologist Dr Christie at [facility] as well.  Due to ongoing hypotension and generalized weakness, we attempted to rule out adrenal insufficiency.    Today: Patient seen and examined at bedside, and chart reviewed.  A.M. cortisol was low.  Patient otherwise stable with systolic pressure remaining in the low 100s on midodrine.      MEDICATIONS   Scheduled   apixaban  5 mg Oral BID    artificial tears  2 drop Both Eyes Q4H While awake    ascorbic acid (vitamin C)  1,000 mg Oral Daily    aspirin  81 mg Oral Daily    brimonidine 0.15 % OPTH DROP  1 drop Both Eyes BID    calcitRIOL  0.25 mcg Oral Daily    cholecalciferol (vitamin D3)  50,000 Units Oral Daily    cosyntropin  0.25 mg Intravenous Once    gabapentin  300 mg Oral BID    methadone  5 mg Oral Q8H    midodrine  2.5 mg Oral TID WM    neomycin-polymyxin-dexamethasone   Left Eye Q6H    polyethylene glycol  17 g Oral Daily    senna  8.6 mg Oral BID     Continuous Infusions   heparin (porcine) in D5W  0-40 Units/kg/hr Intravenous Continuous 13 mL/hr at 06/10/25 0648 17 Units/kg/hr at 06/10/25 0648       PHYSICAL EXAM   VITALS: T 97.6 °F (36.4 °C)   /67   P 78   RR 18   O2 97 %    GENERAL: Awake, NAD  LUNGS: CTA anteriorly  CVS: RRR, S1S2 positive  GI/: Soft, NT/ND, bowel sounds positive.  EXTREMITIES: Peripheral pulses 2+  NEURO: AAOx3  PSYCH: Cooperative      LABS   CBC  Recent Labs     06/09/25  0314 06/10/25  0052   WBC 7.20 7.03   RBC 3.12* 2.87*   HGB 10.8* 10.1*   HCT 33.8* 30.8*   .3* 107.3*   MCH 34.6* 35.2*   MCHC 32.0* 32.8*   RDW 13.3 13.5   * 615*     CHEM  Recent Labs     06/08/25  0431 06/10/25  0052    142   K 4.7 5.2*   CO2 24 29   BUN 29.0* 27.7*   CREATININE 1.88*  2.10*   CALCIUM 8.4 9.5   ALBUMIN 1.9* 2.0*   GLOBULIN 4.1* 4.1*   ALKPHOS 53 55   ALT 14 13   AST 27 25   BILITOT 0.6 0.4       MICROBIOLOGY     Microbiology Results (last 7 days)       Procedure Component Value Units Date/Time    Blood Culture #1 **CANNOT BE ORDERED STAT** [[id number]]  (Normal) Collected: 06/06/25 2021    Order Status: Completed Specimen: Blood Updated: 06/09/25 2201     Blood Culture No Growth At 72 Hours    Blood Culture #2 **CANNOT BE ORDERED STAT** [[id number]]  (Normal) Collected: 06/06/25 2021    Order Status: Completed Specimen: Blood Updated: 06/09/25 2201     Blood Culture No Growth At 72 Hours            DIAGNOSTICS   NM Lung Scan Ventilation Perfusion  Exam quality: adequate for interpretation  Perfusion: No large or multifocal mismatched segmental/subsegmental perfusion defects are appreciated.  Ventilation: Radiotracer activity is noted to be homogeneous and symmetric within the lungs during ventilation acquisitions.  IMPRESSION  Normal V/Q scan (no evidence of PE).  Electronically signed by: Osmany Montoya  Date:    06/08/2025  Time:    15:39      ASSESSMENT   STEMI  RLE DVT  CKD IIIB  Ongoing hypotension, r/o adrenal insufficiency  h/o metastatic renal cell carcinoma with liver/lung invovlement  h/o VHL syndrome with multiple hemangioblastomas, including intracranial  Constipation, on opioid therapy  Severe protein-calorie malnutrition    PLAN   Will transition heparin infusion back DOAC this evening  Will continue medical therapy for STEMI for now with ASA  Patient reports issues with statin in the past, refusing for now  Will check lipids in AM, consider less potent statin, addition of Co-Q10  Further GDMT has bee limited by blood pressures  AM Cortisol low, will perform ACTH stimulation test  Continue midodrine pending results  Aldosterone is still pending as well  Otherwise continue current management and monitoring in the interim        Prophylaxis: Heparin as above        Toi  NIKI Bonilla MD  [facility] Medicine

## 2025-06-11 LAB
ACTH PLAS-MCNC: <5 PG/ML
ANION GAP SERPL CALC-SCNC: 12 MEQ/L
BACTERIA BLD CULT: NORMAL
BACTERIA BLD CULT: NORMAL
BASOPHILS # BLD AUTO: 0.07 X10(3)/MCL
BASOPHILS NFR BLD AUTO: 1.1 %
BUN SERPL-MCNC: 31.2 MG/DL (ref 8.4–25.7)
CALCIUM SERPL-MCNC: 9.3 MG/DL (ref 8.4–10.2)
CHLORIDE SERPL-SCNC: 102 MMOL/L (ref 98–107)
CHOLEST SERPL-MCNC: 110 MG/DL
CHOLEST/HDLC SERPL: 6 {RATIO} (ref 0–5)
CO2 SERPL-SCNC: 27 MMOL/L (ref 22–29)
CORTIS SERPL-SCNC: 13.7 UG/DL
CORTIS SERPL-SCNC: 17.2 UG/DL
CORTIS SERPL-SCNC: 3.2 UG/DL
CREAT SERPL-MCNC: 2.15 MG/DL (ref 0.72–1.25)
CREAT/UREA NIT SERPL: 15
EOSINOPHIL # BLD AUTO: 0.84 X10(3)/MCL (ref 0–0.9)
EOSINOPHIL NFR BLD AUTO: 13 %
ERYTHROCYTE [DISTWIDTH] IN BLOOD BY AUTOMATED COUNT: 13.4 % (ref 11.5–17)
GFR SERPLBLD CREATININE-BSD FMLA CKD-EPI: 35 ML/MIN/1.73/M2
GLUCOSE SERPL-MCNC: 111 MG/DL (ref 74–100)
HCT VFR BLD AUTO: 30.7 % (ref 42–52)
HDLC SERPL-MCNC: 20 MG/DL (ref 35–60)
HGB BLD-MCNC: 9.9 G/DL (ref 14–18)
IMM GRANULOCYTES # BLD AUTO: 0.05 X10(3)/MCL (ref 0–0.04)
IMM GRANULOCYTES NFR BLD AUTO: 0.8 %
IRON SATN MFR SERPL: 25 % (ref 20–50)
IRON SERPL-MCNC: 46 UG/DL (ref 65–175)
LDLC SERPL CALC-MCNC: 78 MG/DL (ref 50–140)
LYMPHOCYTES # BLD AUTO: 1.25 X10(3)/MCL (ref 0.6–4.6)
LYMPHOCYTES NFR BLD AUTO: 19.3 %
MCH RBC QN AUTO: 34.9 PG (ref 27–31)
MCHC RBC AUTO-ENTMCNC: 32.2 G/DL (ref 33–36)
MCV RBC AUTO: 108.1 FL (ref 80–94)
MONOCYTES # BLD AUTO: 0.92 X10(3)/MCL (ref 0.1–1.3)
MONOCYTES NFR BLD AUTO: 14.2 %
NEUTROPHILS # BLD AUTO: 3.35 X10(3)/MCL (ref 2.1–9.2)
NEUTROPHILS NFR BLD AUTO: 51.6 %
NRBC BLD AUTO-RTO: 0 %
PLATELET # BLD AUTO: 635 X10(3)/MCL (ref 130–400)
PMV BLD AUTO: 10.4 FL (ref 7.4–10.4)
POCT GLUCOSE: 126 MG/DL (ref 70–110)
POCT GLUCOSE: 137 MG/DL (ref 70–110)
POTASSIUM SERPL-SCNC: 5.2 MMOL/L (ref 3.5–5.1)
RBC # BLD AUTO: 2.84 X10(6)/MCL (ref 4.7–6.1)
SODIUM SERPL-SCNC: 141 MMOL/L (ref 136–145)
TIBC SERPL-MCNC: 137 UG/DL (ref 60–240)
TIBC SERPL-MCNC: 183 UG/DL (ref 250–450)
TRANSFERRIN SERPL-MCNC: 157 MG/DL (ref 174–364)
TRIGL SERPL-MCNC: 58 MG/DL (ref 34–140)
VIT B12 SERPL-MCNC: 1761 PG/ML (ref 213–816)
VLDLC SERPL CALC-MCNC: 12 MG/DL
WBC # BLD AUTO: 6.48 X10(3)/MCL (ref 4.5–11.5)

## 2025-06-11 PROCEDURE — 63600175 PHARM REV CODE 636 W HCPCS: Performed by: INTERNAL MEDICINE

## 2025-06-11 PROCEDURE — 25000003 PHARM REV CODE 250

## 2025-06-11 PROCEDURE — 80061 LIPID PANEL: CPT | Performed by: INTERNAL MEDICINE

## 2025-06-11 PROCEDURE — 36415 COLL VENOUS BLD VENIPUNCTURE: CPT | Performed by: INTERNAL MEDICINE

## 2025-06-11 PROCEDURE — 82533 TOTAL CORTISOL: CPT | Performed by: INTERNAL MEDICINE

## 2025-06-11 PROCEDURE — 83540 ASSAY OF IRON: CPT | Performed by: INTERNAL MEDICINE

## 2025-06-11 PROCEDURE — 25000003 PHARM REV CODE 250: Performed by: INTERNAL MEDICINE

## 2025-06-11 PROCEDURE — 82024 ASSAY OF ACTH: CPT | Performed by: INTERNAL MEDICINE

## 2025-06-11 PROCEDURE — 82607 VITAMIN B-12: CPT | Performed by: INTERNAL MEDICINE

## 2025-06-11 PROCEDURE — 21400001 HC TELEMETRY ROOM

## 2025-06-11 PROCEDURE — S0109 METHADONE ORAL 5MG: HCPCS | Performed by: INTERNAL MEDICINE

## 2025-06-11 PROCEDURE — 85025 COMPLETE CBC W/AUTO DIFF WBC: CPT | Performed by: INTERNAL MEDICINE

## 2025-06-11 PROCEDURE — 80048 BASIC METABOLIC PNL TOTAL CA: CPT | Performed by: INTERNAL MEDICINE

## 2025-06-11 RX ORDER — HYDROCORTISONE 5 MG/1
5 TABLET ORAL
Status: DISCONTINUED | OUTPATIENT
Start: 2025-06-11 | End: 2025-06-12 | Stop reason: HOSPADM

## 2025-06-11 RX ORDER — COSYNTROPIN 0.25 MG/ML
0.25 INJECTION, POWDER, FOR SOLUTION INTRAMUSCULAR; INTRAVENOUS ONCE
Status: COMPLETED | OUTPATIENT
Start: 2025-06-11 | End: 2025-06-11

## 2025-06-11 RX ORDER — HYDROCORTISONE 5 MG/1
10 TABLET ORAL
Status: DISCONTINUED | OUTPATIENT
Start: 2025-06-12 | End: 2025-06-12 | Stop reason: HOSPADM

## 2025-06-11 RX ADMIN — GABAPENTIN 300 MG: 300 CAPSULE ORAL at 10:06

## 2025-06-11 RX ADMIN — APIXABAN 5 MG: 5 TABLET, FILM COATED ORAL at 09:06

## 2025-06-11 RX ADMIN — Medication 1000 MG: at 10:06

## 2025-06-11 RX ADMIN — HYPROMELLOSE 2910 2 DROP: 5 SOLUTION/ DROPS OPHTHALMIC at 02:06

## 2025-06-11 RX ADMIN — BRIMONIDINE TARTRATE 1 DROP: 1.5 SOLUTION OPHTHALMIC at 10:06

## 2025-06-11 RX ADMIN — MIDODRINE HYDROCHLORIDE 2.5 MG: 2.5 TABLET ORAL at 10:06

## 2025-06-11 RX ADMIN — NEOMYCIN AND POLYMYXIN B SULFATES AND DEXAMETHASONE: 3.5; 10000; 1 OINTMENT OPHTHALMIC at 02:06

## 2025-06-11 RX ADMIN — HYPROMELLOSE 2910 2 DROP: 5 SOLUTION/ DROPS OPHTHALMIC at 10:06

## 2025-06-11 RX ADMIN — APIXABAN 5 MG: 5 TABLET, FILM COATED ORAL at 10:06

## 2025-06-11 RX ADMIN — METHADONE HYDROCHLORIDE 5 MG: 5 SOLUTION ORAL at 02:06

## 2025-06-11 RX ADMIN — GABAPENTIN 300 MG: 300 CAPSULE ORAL at 09:06

## 2025-06-11 RX ADMIN — METHADONE HYDROCHLORIDE 5 MG: 5 SOLUTION ORAL at 05:06

## 2025-06-11 RX ADMIN — HYDROCORTISONE 5 MG: 5 TABLET ORAL at 06:06

## 2025-06-11 RX ADMIN — MIDODRINE HYDROCHLORIDE 2.5 MG: 2.5 TABLET ORAL at 02:06

## 2025-06-11 RX ADMIN — POLYETHYLENE GLYCOL 3350 17 G: 17 POWDER, FOR SOLUTION ORAL at 10:06

## 2025-06-11 RX ADMIN — COSYNTROPIN 0.25 MG: 0.25 INJECTION, POWDER, LYOPHILIZED, FOR SOLUTION INTRAMUSCULAR; INTRAVENOUS at 10:06

## 2025-06-11 RX ADMIN — CALCITRIOL CAPSULES 0.25 MCG 0.25 MCG: 0.25 CAPSULE ORAL at 10:06

## 2025-06-11 RX ADMIN — SENNOSIDES 8.6 MG: 8.6 TABLET, FILM COATED ORAL at 09:06

## 2025-06-11 RX ADMIN — BRIMONIDINE TARTRATE 1 DROP: 1.5 SOLUTION OPHTHALMIC at 09:06

## 2025-06-11 RX ADMIN — SENNOSIDES 8.6 MG: 8.6 TABLET, FILM COATED ORAL at 10:06

## 2025-06-11 RX ADMIN — HYPROMELLOSE 2910 2 DROP: 5 SOLUTION/ DROPS OPHTHALMIC at 09:06

## 2025-06-11 RX ADMIN — METHADONE HYDROCHLORIDE 5 MG: 5 SOLUTION ORAL at 09:06

## 2025-06-11 RX ADMIN — ASPIRIN 81 MG: 81 TABLET, COATED ORAL at 10:06

## 2025-06-11 RX ADMIN — NEOMYCIN AND POLYMYXIN B SULFATES AND DEXAMETHASONE: 3.5; 10000; 1 OINTMENT OPHTHALMIC at 06:06

## 2025-06-11 RX ADMIN — LEVOTHYROXINE SODIUM 25 MCG: 0.03 TABLET ORAL at 05:06

## 2025-06-11 RX ADMIN — MIDODRINE HYDROCHLORIDE 2.5 MG: 2.5 TABLET ORAL at 06:06

## 2025-06-11 RX ADMIN — HYPROMELLOSE 2910 2 DROP: 5 SOLUTION/ DROPS OPHTHALMIC at 06:06

## 2025-06-11 NOTE — PROGRESS NOTES
Hospital Medicine  Progress Note    Patient Name: Robert Lubin  MRN: [id number]  Status: IP- Inpatient   Admission Date: 6/6/2025  Length of Stay: 5  Date of Service: 06/11/2025       CC: hospital follow-up for chest pain       SUBJECTIVE    54 y.o. 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  EKG showed q waves in the inferior leads with 1 mm ST elevation.  Initial troponin 2.2 (subsequent 2.4->2.3).  Additional labs included mostly unremarkable CBC with exception of mild anemia, white count normal.  Chemistries consistent with his baseline CKD IIIB.  He was admitted to Cardiology services 6/6 and underwent LHC, with caution to contrast load; findings however noted CAD, though right heart strain noted, and there was concern for PE.  Echo showed an EF 40-45% with mild global hypokinesis  (recent Echo at [facility] with an EF of 58%).  Cardiology concerned for PE; hospital medicine consulted for further input.    Recent history notes patient was due for Opdivo last week but was experiencing hypotension. He was then sent to the ED at [facility] for a blood pressures in the 70's/40's.  He was then diagnosed with a RLE DVT.  Wife reports that a V/Q scan at that time was negative.  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  They note his blood pressures had been irregular since.  He did follow up with his Cardiologist once back here in [place], and was started on sodium supplements along with Midodrine.  But both his oral and IV chemo remain on hold at this time.    Admitted to  services.  Continued medical therapy including ASA and heparin infusion for STEMI.  Remained HDS stable on midodrine,  H&H stable.  Underwent VQ scan for PE, which was negative.  Patient will no chest pain, holding off on stenting due to concern over DAPT + AC, and concern for renal dysfunction and contrast. Continued on medical management for CAD.  Discussed his care with his Oncologist Dr Christie at [facility] as well.  Due to ongoing hypotension and generalized weakness, we attempted to rule out adrenal insufficiency.    Today: Patient seen and examined at bedside, and chart reviewed.  A.M. cortisol was low at 2.  ACTH test today notes cortisol spike less than optimal at 17.      MEDICATIONS   Scheduled   apixaban  5 mg Oral BID    artificial tears  2 drop Both Eyes Q4H While awake    ascorbic acid (vitamin C)  1,000 mg Oral Daily    aspirin  81 mg Oral Daily    brimonidine 0.15 % OPTH DROP  1 drop Both Eyes BID    calcitRIOL  0.25 mcg Oral Daily    cholecalciferol (vitamin D3)  50,000 Units Oral Daily    gabapentin  300 mg Oral BID    [START ON 6/12/2025] hydrocortisone  10 mg Oral Before breakfast    hydrocortisone  5 mg Oral Before dinner    levothyroxine  25 mcg Oral Before breakfast    methadone  5 mg Oral Q8H    midodrine  2.5 mg Oral TID WM    neomycin-polymyxin-dexamethasone   Left Eye Q6H    polyethylene glycol  17 g Oral Daily    senna  8.6 mg Oral BID     Continuous Infusions  None      PHYSICAL EXAM   VITALS: T 98.7 °F (37.1 °C)   /69   P 81   RR 16   O2 97 %    GENERAL: Awake, NAD  LUNGS: CTA anteriorly  CVS: RRR, S1S2 positive  GI/: Soft, NT/ND, bowel sounds positive.  EXTREMITIES: Peripheral pulses 2+  NEURO: AAOx3  PSYCH: Cooperative      LABS   CBC  Recent Labs     06/10/25  0052 06/11/25  0355   WBC 7.03 6.48   RBC 2.87* 2.84*   HGB 10.1* 9.9*   HCT 30.8* 30.7*   .3* 108.1*   MCH 35.2* 34.9*   MCHC 32.8* 32.2*   RDW 13.5 13.4   * 635*     CHEM  Recent Labs     06/10/25  0052 06/10/25  1427 06/11/25  0355     --  141   K 5.2*  --  5.2*   CO2 29  --  27   BUN 27.7*  --  31.2*    CREATININE 2.10*  --  2.15*   CALCIUM 9.5  --  9.3   ALBUMIN 2.0*  --   --    GLOBULIN 4.1*  --   --    ALKPHOS 55  --   --    ALT 13  --   --    AST 25  --   --    BILITOT 0.4  --   --    IRON  --   --  46*   TIBC  --   --  183*   TSH  --  4.228  --        MICROBIOLOGY     Microbiology Results (last 7 days)       Procedure Component Value Units Date/Time    Blood Culture #1 **CANNOT BE ORDERED STAT** [[id number]]  (Normal) Collected: 06/06/25 2021    Order Status: Completed Specimen: Blood Updated: 06/10/25 2201     Blood Culture No Growth At 96 Hours    Blood Culture #2 **CANNOT BE ORDERED STAT** [[id number]]  (Normal) Collected: 06/06/25 2021    Order Status: Completed Specimen: Blood Updated: 06/10/25 2201     Blood Culture No Growth At 96 Hours            DIAGNOSTICS   NM Lung Scan Ventilation Perfusion  Exam quality: adequate for interpretation  Perfusion: No large or multifocal mismatched segmental/subsegmental perfusion defects are appreciated.  Ventilation: Radiotracer activity is noted to be homogeneous and symmetric within the lungs during ventilation acquisitions.  IMPRESSION  Normal V/Q scan (no evidence of PE).  Electronically signed by: Osmany Montoya  Date:    06/08/2025  Time:    15:39      ASSESSMENT   STEMI  RLE DVT  CKD IIIB  Ongoing hypotension, r/o adrenal insufficiency  h/o metastatic renal cell carcinoma with liver/lung invovlement  h/o VHL syndrome with multiple hemangioblastomas, including intracranial  Constipation, on opioid therapy  Severe protein-calorie malnutrition    PLAN   Will start hydrocortisone  Continue medical therapy for STEMI for now with ASA  Patient with prior muscle issues with statin, could consider less potent statin, or addition of Co-Q10 in the future, though LDL on 78  Further GDMT has bee limited by blood pressures  Continue DOAC as previous  Continue midodrine for now, may be able to wean with addition of adrenal replacement therapy  Aldosterone is still pending as  well  Otherwise continue current management and monitoring in the interim        Prophylaxis: Heparin as above        Toi Bonilla MD  [facility]

## 2025-06-12 VITALS
WEIGHT: 168 LBS | HEART RATE: 81 BPM | RESPIRATION RATE: 18 BRPM | DIASTOLIC BLOOD PRESSURE: 71 MMHG | OXYGEN SATURATION: 98 % | HEIGHT: 72 IN | SYSTOLIC BLOOD PRESSURE: 112 MMHG | BODY MASS INDEX: 22.75 KG/M2 | TEMPERATURE: 98 F

## 2025-06-12 LAB
ACTH PLAS-MCNC: <5 PG/ML
ALDOST SERPL-MCNC: <4 NG/DL
BASOPHILS # BLD AUTO: 0.08 X10(3)/MCL
BASOPHILS NFR BLD AUTO: 0.9 %
EOSINOPHIL # BLD AUTO: 0.69 X10(3)/MCL (ref 0–0.9)
EOSINOPHIL NFR BLD AUTO: 7.5 %
ERYTHROCYTE [DISTWIDTH] IN BLOOD BY AUTOMATED COUNT: 13.7 % (ref 11.5–17)
HCT VFR BLD AUTO: 34.2 % (ref 42–52)
HGB BLD-MCNC: 10.8 G/DL (ref 14–18)
IMM GRANULOCYTES # BLD AUTO: 0.05 X10(3)/MCL (ref 0–0.04)
IMM GRANULOCYTES NFR BLD AUTO: 0.5 %
LYMPHOCYTES # BLD AUTO: 1.77 X10(3)/MCL (ref 0.6–4.6)
LYMPHOCYTES NFR BLD AUTO: 19.2 %
MCH RBC QN AUTO: 34.4 PG (ref 27–31)
MCHC RBC AUTO-ENTMCNC: 31.6 G/DL (ref 33–36)
MCV RBC AUTO: 108.9 FL (ref 80–94)
MONOCYTES # BLD AUTO: 1.18 X10(3)/MCL (ref 0.1–1.3)
MONOCYTES NFR BLD AUTO: 12.8 %
NEUTROPHILS # BLD AUTO: 5.46 X10(3)/MCL (ref 2.1–9.2)
NEUTROPHILS NFR BLD AUTO: 59.1 %
NRBC BLD AUTO-RTO: 0 %
PLATELET # BLD AUTO: 767 X10(3)/MCL (ref 130–400)
PMV BLD AUTO: 10.5 FL (ref 7.4–10.4)
POCT GLUCOSE: 101 MG/DL (ref 70–110)
RBC # BLD AUTO: 3.14 X10(6)/MCL (ref 4.7–6.1)
WBC # BLD AUTO: 9.23 X10(3)/MCL (ref 4.5–11.5)

## 2025-06-12 PROCEDURE — S0109 METHADONE ORAL 5MG: HCPCS | Performed by: INTERNAL MEDICINE

## 2025-06-12 PROCEDURE — 25000003 PHARM REV CODE 250: Performed by: INTERNAL MEDICINE

## 2025-06-12 PROCEDURE — 25000003 PHARM REV CODE 250

## 2025-06-12 PROCEDURE — 36415 COLL VENOUS BLD VENIPUNCTURE: CPT | Performed by: INTERNAL MEDICINE

## 2025-06-12 PROCEDURE — 85025 COMPLETE CBC W/AUTO DIFF WBC: CPT | Performed by: INTERNAL MEDICINE

## 2025-06-12 RX ORDER — SENNOSIDES 8.6 MG/1
1 TABLET ORAL 2 TIMES DAILY
COMMUNITY
Start: 2025-06-12

## 2025-06-12 RX ORDER — POLYETHYLENE GLYCOL 3350 17 G/17G
17 POWDER, FOR SOLUTION ORAL DAILY
COMMUNITY
Start: 2025-06-13

## 2025-06-12 RX ORDER — HYDROCORTISONE 10 MG/1
TABLET ORAL
Qty: 45 TABLET | Refills: 0 | Status: SHIPPED | OUTPATIENT
Start: 2025-06-13

## 2025-06-12 RX ADMIN — METHADONE HYDROCHLORIDE 5 MG: 5 SOLUTION ORAL at 06:06

## 2025-06-12 RX ADMIN — MIDODRINE HYDROCHLORIDE 2.5 MG: 2.5 TABLET ORAL at 08:06

## 2025-06-12 RX ADMIN — HYPROMELLOSE 2910 2 DROP: 5 SOLUTION/ DROPS OPHTHALMIC at 09:06

## 2025-06-12 RX ADMIN — LEVOTHYROXINE SODIUM 25 MCG: 0.03 TABLET ORAL at 06:06

## 2025-06-12 RX ADMIN — SENNOSIDES 8.6 MG: 8.6 TABLET, FILM COATED ORAL at 08:06

## 2025-06-12 RX ADMIN — CALCITRIOL CAPSULES 0.25 MCG 0.25 MCG: 0.25 CAPSULE ORAL at 08:06

## 2025-06-12 RX ADMIN — HYDROCORTISONE 10 MG: 5 TABLET ORAL at 06:06

## 2025-06-12 RX ADMIN — HYPROMELLOSE 2910 2 DROP: 5 SOLUTION/ DROPS OPHTHALMIC at 06:06

## 2025-06-12 RX ADMIN — NEOMYCIN AND POLYMYXIN B SULFATES AND DEXAMETHASONE: 3.5; 10000; 1 OINTMENT OPHTHALMIC at 06:06

## 2025-06-12 RX ADMIN — BRIMONIDINE TARTRATE 1 DROP: 1.5 SOLUTION OPHTHALMIC at 08:06

## 2025-06-12 RX ADMIN — GABAPENTIN 300 MG: 300 CAPSULE ORAL at 08:06

## 2025-06-12 RX ADMIN — POLYETHYLENE GLYCOL 3350 17 G: 17 POWDER, FOR SOLUTION ORAL at 08:06

## 2025-06-12 RX ADMIN — Medication 1000 MG: at 08:06

## 2025-06-12 RX ADMIN — ASPIRIN 81 MG: 81 TABLET, COATED ORAL at 08:06

## 2025-06-12 RX ADMIN — MIDODRINE HYDROCHLORIDE 2.5 MG: 2.5 TABLET ORAL at 11:06

## 2025-06-12 RX ADMIN — APIXABAN 5 MG: 5 TABLET, FILM COATED ORAL at 08:06

## 2025-06-12 NOTE — PLAN OF CARE
Problem: Adult Inpatient Plan of Care  Goal: Plan of Care Review  Outcome: Adequate for Care Transition  Goal: Patient-Specific Goal (Individualized)  Outcome: Adequate for Care Transition  Goal: Absence of Hospital-Acquired Illness or Injury  Outcome: Adequate for Care Transition  Goal: Optimal Comfort and Wellbeing  Outcome: Adequate for Care Transition  Goal: Readiness for Transition of Care  Outcome: Adequate for Care Transition     Problem: Wound  Goal: Optimal Coping  Outcome: Adequate for Care Transition  Goal: Optimal Functional Ability  Outcome: Adequate for Care Transition  Goal: Absence of Infection Signs and Symptoms  Outcome: Adequate for Care Transition  Goal: Improved Oral Intake  Outcome: Adequate for Care Transition  Goal: Optimal Pain Control and Function  Outcome: Adequate for Care Transition  Goal: Skin Health and Integrity  Outcome: Adequate for Care Transition  Goal: Optimal Wound Healing  Outcome: Adequate for Care Transition     Problem: Fall Injury Risk  Goal: Absence of Fall and Fall-Related Injury  Outcome: Adequate for Care Transition     Problem: Skin Injury Risk Increased  Goal: Skin Health and Integrity  Outcome: Adequate for Care Transition

## 2025-06-12 NOTE — PROGRESS NOTES
AVS virtually reviewed with Robert Lubin in its entirety with emphasis on diet, medications, follow-up appointments and reasons to return to the ED. Patient also encouraged to utilize their patient portal. Ease and convenience of use reiterated. Education complete and patient voiced understanding. All questions answered. Discharge teaching complete.

## 2025-06-12 NOTE — DISCHARGE INSTRUCTIONS
Our goal at [facility] is to always give you outstanding care and exceptional service. You may receive a survey from [employer] by mail, text or e-mail in the next 24-48 hours asking about the care you received with us. The survey should only take 5-10 minutes to complete and is very important to us.     Your feedback provides us with a way to recognize our staff who work tirelessly to provide the best care! Also, your responses help us learn how to improve when your experience was below our aspiration of excellence. We are always looking for ways to improve your stay. We WILL use your feedback to continue making improvements to help us provide the highest quality care. We keep your personal information and feedback confidential. We appreciate your time completing this survey and can't wait to hear from you!!!    We look forward to your continued care with us! Thanks so much for choosing [facility] for your healthcare needs!

## 2025-06-12 NOTE — PLAN OF CARE
06/12/25 1057   Final Note   Assessment Type Discharge Planning Assessment   Anticipated Discharge Disposition Home   Hospital Resources/Appts/Education Provided Appointments scheduled and added to AVS   Post-Acute Status   Discharge Delays None known at this time     Pt will dc home today. Pt asked I send records to Dr Valentino's office. Medical records faxed to Dr Valentino's office at [phone]  Also to Dr Matthew's office. Called the office and they have access to epic. They do not require medical recs be sent to them  Also Dr Manny Christie with [facility]. Called [facility] re medical recs and the lady reported they have access to [facility] medical recs. She was able to see pt's medical recs

## 2025-06-13 ENCOUNTER — PATIENT OUTREACH (OUTPATIENT)
Dept: ADMINISTRATIVE | Facility: CLINIC | Age: 55
End: 2025-06-13
Payer: COMMERCIAL

## 2025-06-13 LAB — ALDOST SERPL-MCNC: <4 NG/DL

## 2025-06-13 NOTE — PROGRESS NOTES
C3 nurse attempted to contact Robert Lubin for a TCC post hospital discharge follow-up call. Patient answered TCC questions; however, declined to review medications. Patient aware of HOSFU appt with Ankit Khanna Jr., MD on 6/18 at 1000.

## 2025-06-18 NOTE — DISCHARGE SUMMARY
[facility] Hospital Medicine Discharge Summary    Admit Date: 6/6/2025  Discharge Date and Time: 6/12/2025, 05:15 pm  Admitting Physician:  Team  Discharging Physician: Petty Stephens MD.  Primary Care Physician: Ankit Khanna Jr., MD  Consults: Cardiology    Discharge Diagnoses:  STEMI, POA  Left lower ext DVT, POA  CKD IIIb, POA  Persistent Hypotension, Adrenal insufficiency highly suspected , POA  Constipation due to opioid analgesic use, POA  Severe protein calorie malnutrition, POA    Hx- metastatic renal cell carcinoma with liver/lung mets, Cerebellar hemangioblastomatosis;   Von Hippel-Lindau syndrome;     Hospital Course:     54 y.o. male with PMHx of renal carcinoma metastatic to lung and liver, CAD on ASA and DVT on Eliquis presented to ED with a constellation of symptoms that includes chest pain. EKG showed q waves in the inferior leads with 1 mm ST elevation. Initial troponin 2.2 (subsequent 2.4->2.3).  Additional labs were mostly unremarkable with exception of mild anemia, Chemistries consistent with his baseline CKD IIIB.  He was admitted to Cardiology services on 6/6 /25 for STEMI and underwent LHC, with caution to contrast load; findings noted CAD, though right heart strain noted, and there was concern for PE.  Echo showed an EF 40-45% with mild global hypokinesis  (recent Echo at [facility] with an EF of 58%).  Cardiology concerned for PE.  service consulted for medical management. Recent history notes patient was due for Opdivo last week but was experiencing hypotension. He was then sent to the ED at [facility] for a blood pressures  70's/40's.  He was then diagnosed with a left lower ext  DVT.  Wife reports that a V/Q scan at that time was negative.  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 and was  "discharged on  Friday 5/30. Pt noted his blood pressures had been irregular since.  He did follow up with his Cardiologist after return home from [facility], and was started on sodium supplements along with Midodrine.  But both his oral and IV chemo remain on hold at this time.    Pt's care transferred to  service . Pt was continued on medical management for STEMI with ASA/ Heparin gtt. Holding off of  stenting due to concern for bleeding risk with DAPT and current Eliquis. V/Q scan neg for PE. Pt remained hypotensive despite midodrine. AM cortisol was as low as 2. ACTH stimulation test was performed. Showed cortisol spike less than optimal at 17.  Pt was empirically started on Hydrocortisone 10 mg before breakfast and 5 mg before dinner with improvement of BP reading.   ACTH <5, Aldosterone <4. . Florinef 100 mcg daily added. Pt was referred to Endocrinology as outpatient. Pt remained otherwise stable and deemed suitable for discharge and follow up with Primary Cardiologist as outpatient.      Pt was seen and examined on the day of discharge  Vitals:  VITAL SIGNS: 24 HRS MIN & MAX LAST   No data recorded 98 °F (36.7 °C)   No data recorded 112/71   No data recorded  81   No data recorded 18   No data recorded 98 %       Physical Exam:  GENERAL: Awake, NAD  LUNGS: CTA anteriorly  CVS: RRR, S1S2 positive  GI/: Soft, NT/ND, bowel sounds positive.  EXTREMITIES: Peripheral pulses 2+  NEURO: AAOx3  PSYCH: Cooperative       Procedures Performed: No admission procedures for hospital encounter.     Significant Diagnostic Studies: See Full reports for all details    Recent Labs   Lab 06/12/25  0622   WBC 9.23   RBC 3.14*   HGB 10.8*   HCT 34.2*   .9*   MCH 34.4*   MCHC 31.6*   RDW 13.7   *   MPV 10.5*       No results for input(s): "NA", "K", "CL", "CO2", "ANIONGAP", "BUN", "CREATININE", "GLU", "CALCIUM", "PH", "MG", "ALBUMIN", "PROT", "ALKPHOS", "ALT", "AST", "BILITOT" in the last 168 hours.   "   Microbiology Results (last 7 days)       Procedure Component Value Units Date/Time    Blood Culture #1 **CANNOT BE ORDERED STAT** [[id number]]  (Normal) Collected: 06/06/25 2021    Order Status: Completed Specimen: Blood Updated: 06/11/25 2202     Blood Culture No Growth at 5 days    Blood Culture #2 **CANNOT BE ORDERED STAT** [[id number]]  (Normal) Collected: 06/06/25 2021    Order Status: Completed Specimen: Blood Updated: 06/11/25 2100     Blood Culture No Growth at 5 days             Cardiac catheterization    The estimated blood loss was none.    The procedure log was documented by No documenter listed and verified by   Bhaskar Coffey MD.    Date: 6/6/2025  Time: 8:28 PM  Preprocedure diagnosis-STEMI  Postprocedure diagnosis-Obstructive CAD, no obvious culprit or acute   vessel closure  Estimated blood loss-5 cc  Tissue removal-none  Complications-none  Contrast 20 cc    Procedures performed:  1. Ultrasound-guided right radial access  2. Coronary angiography, limited contrast  3. Left ventricular hemodynamics  4. 3 vessel IVUS to conserve contrast use  5. TR band access site hemostasis    Findings:  1. Left main-long, normal  2. Lad-64% calcified mid stenosis by IVUS (MLA 4.8mm2) diagonal 1-99%   stenosis  3. LCX-non dominant, Calcified 50% stenosis mid segment, collateral noted   to presumed RV marginal, likely jailed from old RCA stent  4. RCA-dominant, Patent mid stent, appears undersized for vessel on IVUS,   no thrombus or ISR noted  5. EDP 10    Procedure detail:  Ultrasound-guided right radial access obtained.  Sheath inserted.    Vasodilators and heparin administered.  Tiger catheter used for left   ventricular hemodynamics.  Catheter used for selective left coronary   angiography.  Catheter kept engaging the conus.  I decided to exchange for   a JR4 guide to IVUS and minimize contrast use.  Guide was placed in the   RCA.  Wire advanced into the PDA.  IVUS performed.  Final angio was   satisfactory.   Guide removed.  EBU guide was placed in the LM.  1 set up   shot in Ap caudal performed.  Wire placed in the LAD and IVUS performed.    Wire redirected into the circ and IVUS performed. Catheter was removed and   a TR band was utilized for access site hemostasis.      Plan:  1. Serial troponins  2. Echo for RV strain  3. High suspicion for PE.  Will consult Hospitalist to assume care  given   co morbidities.  CTA with contrast will further put patient at risk for   SHALOM.  They may want to consider VQ scan.  4.  Patient also immunocompromised and at risk for infection and bleeding.  5. Would consider Heparin gtt instead of lovenox in case bleeding becomes   an issue.    6. Discussed plan in detail with patient's wife.  7. Medical MNGt for CAD with asa, heparin.  Would like to add plavix but   patient has brain mas per family member, unsure if it is contraindicated   in this setting.         Medication List        START taking these medications      hydrocortisone 10 MG Tab  Commonly known as: CORTEF  One tab before breakfast and 1/2 tab before dinner     polyethylene glycol 17 gram Pwpk  Commonly known as: GLYCOLAX  Take 17 g by mouth once daily.     senna 8.6 mg tablet  Commonly known as: SENOKOT  Take 1 tablet by mouth 2 (two) times a day.            CONTINUE taking these medications      apixaban 5 mg Tab  Commonly known as: ELIQUIS     aspirin 81 MG EC tablet  Commonly known as: ECOTRIN     brimonidine 0.2% 0.2 % Drop  Commonly known as: ALPHAGAN     CABOMETYX 40 mg Tab  Generic drug: cabozantinib     calcitRIOL 0.25 MCG Cap  Commonly known as: ROCALTROL     cholecalciferol (vitamin D3) 1,250 mcg (50,000 unit) capsule     cycloSPORINE 0.05 % ophthalmic emulsion  Commonly known as: RESTASIS     gabapentin 300 MG capsule  Commonly known as: NEURONTIN     HYDROcodone-acetaminophen  mg per tablet  Commonly known as: NORCO     levothyroxine 25 MCG tablet  Commonly known as: SYNTHROID     methadone 5 MG  tablet  Commonly known as: DOLOPHINE     midodrine 2.5 MG Tab  Commonly known as: PROAMATINE     neomycin-polymyxin-dexamethasone 3.5 mg/g-10,000 unit/g-0.1 % Oint  Commonly known as: DEXACINE            STOP taking these medications      VITAMIN C 500 MG tablet  Generic drug: ascorbic acid (vitamin C)               Where to Get Your Medications        [facility] [address] [facility] [address]      Phone: [phone]   hydrocortisone 10 MG Tab       You can get these medications from any pharmacy    You don't need a prescription for these medications  polyethylene glycol 17 gram Pwpk  senna 8.6 mg tablet          Explained in detail to the patient about the discharge plan, medications, and follow-up visits. Pt understands and agrees with the treatment plan  Discharge Disposition: Home or Self Care   Discharged Condition: stable  Diet-    Medications Per DC med rec  Activities as tolerated   Follow-up Information       Ankit Khanna Jr., MD. Go on 6/18/2025.    Specialty: Family Medicine  Why: @1000  Hospital discharge follow up  Contact information:  [address]  [address]  [facility] [address] [phone]               Valentino, Vernon A., MD. Go on 6/16/2025.    Specialty: Cardiology  Why: @845  Pt's primary cardiologist for STEMI  Contact information:  [address]  [facility] [address]  [phone]               Osmany Nolan MD. Schedule an appointment as soon as possible for a visit in 1 week(s).    Specialty: Endocrinology  Why: New Pt appt for suspected hypoadrenalism - referral has been sent and they will contact you with appt  Contact information:  [address]  [address]  [facility] [id number] [phone]                           For further questions contact hospitalist office    Discharge time 33 minutes    For worsening symptoms, chest  pain, shortness of breath, increased abdominal pain, high grade fever, stroke or stroke like symptoms, immediately go to the nearest Emergency Room or call 911 as soon as possible.      Petty Stephens M.D, on 6/12/2025, 05:15 pm

## 2025-06-30 DIAGNOSIS — M62.81 MUSCLE WEAKNESS (GENERALIZED): Primary | ICD-10-CM

## 2025-07-03 ENCOUNTER — CLINICAL SUPPORT (OUTPATIENT)
Dept: REHABILITATION | Facility: HOSPITAL | Age: 55
End: 2025-07-03
Payer: COMMERCIAL

## 2025-07-03 DIAGNOSIS — R29.898 DECREASED STRENGTH OF TRUNK AND BACK: ICD-10-CM

## 2025-07-03 DIAGNOSIS — R29.898 IMPAIRED FLEXIBILITY OF LOWER EXTREMITY: ICD-10-CM

## 2025-07-03 DIAGNOSIS — R26.89 BALANCE PROBLEMS: ICD-10-CM

## 2025-07-03 DIAGNOSIS — R29.898 DECREASED STRENGTH OF LOWER EXTREMITY: ICD-10-CM

## 2025-07-03 DIAGNOSIS — M62.81 MUSCLE WEAKNESS (GENERALIZED): Primary | ICD-10-CM

## 2025-07-03 DIAGNOSIS — R29.818 IMPAIRED PROPRIOCEPTION: ICD-10-CM

## 2025-07-03 PROCEDURE — 97163 PT EVAL HIGH COMPLEX 45 MIN: CPT

## 2025-07-03 NOTE — PROGRESS NOTES
Outpatient Rehab    Physical Therapy Evaluation (only)    Patient Name: Robert Lubin  MRN: [id number]  YOB: 1970  Encounter Date: 7/3/2025    Therapy Diagnosis:   Encounter Diagnoses   Name Primary?    Muscle weakness (generalized) Yes    Decreased strength of lower extremity     Decreased strength of trunk and back     Impaired flexibility of lower extremity     Balance problems     Impaired proprioception      Physician: AILYN Trinidad*    Physician Orders: Eval and Treat  Medical Diagnosis: Muscle weakness (generalized)  Surgical Diagnosis: Not applicable for this Episode  Surgical Date: Not applicable for this Episode  Days Since Last Surgery: Not applicable for this Episode    Visit # / Visits Authorized:  1 / 1  Insurance Authorization Period: 6/30/2025 to 6/30/2026  Date of Evaluation: 7/3/2025  Plan of Care Certification: 7/3/2025 to TO BE DETERMINED      Time In: 1418   Time Out: 1526  Total Time (in minutes): 68   Total Billable Time (in minutes):      Intake Outcome Measure for FOTO Survey    Therapist reviewed FOTO scores for Robert Lubin on 7/3/2025.   FOTO report - see Media section or FOTO account episode details.     Intake Score: 73%    Precautions:       Subjective   History of Present Illness   is a 54 y.o. male who reports to physical therapy with a chief concern of Bilateral LE weakness R > L.     The patient reports a medical diagnosis of M62.81 Muscular weakness. The patient has experienced this issue since 06/30/25.   Diagnostic tests related to this condition: MRI studies.   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 In 1991 he underwent surgery to remove a tumor from the spine at L4 resulting in right lower extremity weakness and drop foot.   In 2014 he underwent surgery to remove a tumor from the spine at T8 resulting in ongoing right lower extremity weakness.  states that he was doing fine until about 4 years ago when he began noticing a significant decline in the both lower extremities right more than left. He has participated in 3 clinical trials at [facility] and lost 44 lbs in the past 2 months. 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 His kidney function is reduced to 30% at this time. Doctors give him a life expectancy of 3 years.  has been having increasing difficulty maintaining balance and feel as recent as two weeks ago due to his legs giving out. His goal is to increase overall strength and improve functional mobility.     Activities of Daily Living  Social history was obtained from Patient.    General Prior Level of Function Comments: Independent  General Current Level of Function Comments: Independent  Patient Responsibilities: Community mobility, Driving, Financial management, Health management, Home management, Shopping, Yard work, Personal ADL    Previously independent with activities of daily living? Yes     Currently independent with activities of daily living? Yes          Previously independent with instrumental activities of daily living? Yes     Currently independent with instrumental activities of daily living? Yes              Pain     Patient reports a current pain level of 0/10. Pain at best is reported as 0/10. Pain at worst is reported as 7/10.   Location: bilateral lower extremities right > left  Clinical Progression (since onset): Worsening  Pain Qualities: Aching  Pain-Relieving Factors: Rest, Relaxation  Pain-Aggravating Factors: Walking, Stair climbing         Living Arrangements  Living Situation  Housing: Home independently  Living Arrangements: Spouse/significant other  Support Systems: Spouse/significant other    Home Setup  Type of Structure: House  Home  Access: Level entry  Number of Levels in Home: One level        Employment      Retired pharmacist       Past Medical History/Physical Systems Review:   Robert Lubin  has a past medical history of Von Hippel-Lindau disease.    Robert Lubin  has a past surgical history that includes Left heart catheterization (Left, 6/6/2025).     has a current medication list which includes the following prescription(s): apixaban, aspirin, brimonidine 0.2%, cabometyx, calcitriol, cholecalciferol (vitamin d3), cyclosporine, fludrocortisone, gabapentin, hydrocodone-acetaminophen, hydrocortisone, levothyroxine, methadone, midodrine, neomycin-polymyxin-dexamethasone, polyethylene glycol, and senna.    Review of patient's allergies indicates:  No Known Allergies     Objective   Posture        Shoulders are Elevated. Right scapula is: Elevated         Right knee position is: Genu Recurvatum       Lower Extremity Sensation  General Lumbar/Lower Extremity Sensation  Impaired: Right  Right Lumbar/Lower Extremity Sensation  Absent: Light Touch, Sharp/Dull Discrimination, Static Two Point Discrimination, Dynamic Two Point Discrimination, and Proprioception  Right Lumbar/Lower Extremity Sensation Light Touch Comment: unable to feel in right calf and lower leg                   Hip Range of Motion    Within normal limits for all ranges     Knee Range of Motion    Within normal limits for all ranges     Ankle/Foot Range of Motion   Right Ankle/Foot   Active (deg) Passive (deg) Pain   Dorsiflexion (KE) 12       Dorsiflexion (KF)         Plantar Flexion         Ankle Inversion         Ankle Eversion         Subtalar Inversion         Subtalar Eversion         Great Toe MTP Flexion         Great Toe MTP Extension         Great Toe IP Flexion                              Hip Strength - Planes of Motion   Right Strength Right Pain Left Strength Left  Pain   Flexion (L2) 2+   4+     Extension 3+   4+     ABduction 2+   4     ADduction 3-    4+     Internal Rotation 3   5     External Rotation 3+   5         Knee Strength   Right Strength Right Pain Left Strength Left  Pain   Flexion (S2) 3-   5     Prone Flexion 3-   5     Extension (L3) 4   5            Ankle/Foot Strength - Planes of Motion   Right Strength Right Pain Left Strength Left  Pain   Dorsiflexion (L4) 2+   5     Plantar Flexion (S1) 2+   5     Inversion           Eversion           Great Toe Flexion           Great Toe Extension (L5)           Lesser Toes Flexion           Lesser Toes Extension                  Transfers Assessment  Sit to Stand Assistance: Independent      Fall Risk  Functional mobility test results suggest the patient is: At Risk for Falls  Four Stage Balance Test  Narrow Base of Support: 30 sec sec              Single Leg Stand - Right Foot: 0 sec  Single Leg Stand - Left Foot: 10 sec  MCTSIB : 2/4     30/30/0/0    Sit to Stand Testing      The patient completed 14 repetitions of a sit to stand transfer in 30 seconds.        Squat Testing  The patient completed 3 squat repetitions.  Observations  Right Side: Limited Ankle Dorsiflexion             Ambulation Assistance Required  Surface With  Assistive Device Without Assistive Device Details   Level   Independent      Uneven   Independent     Curb           Stairs Assistance Required   Assistance Level Upper Extremity Support Pattern   Ascending Independent Two rails Reciprocal   Descending Independent Two rails Non-reciprocal        Gait Analysis  Walking Speed: Increased    Right Foot Contact Pattern: Heel to toe       Ankle/Foot Observations During Gait  Right: Foot Drop  Gait Analysis Details  Foot drop increases with fatigue          Time Entry(in minutes):  PT Evaluation (Complex) Time Entry: 68    Assessment & Plan   Assessment   presents with a condition of Moderate complexity.   Presentation of Symptoms: Stable  Will Comorbidities Impact Care: Yes  Receiving chemo from cancer treatments    Functional  Limitations: Activity tolerance, Ambulating on uneven surfaces, Community integration, Decreased ambulation distance/endurance, Maintaining balance, Increased risk of fall, Proprioception  Impairments: Impaired physical strength, Safety issue, Impaired balance, Abnormal muscle tone  Personal Factors Affecting Prognosis: Physical limitations    Patient Goal for Therapy (PT): I would like to improve my leg strength and establish an exercise program to maintain mobility  Prognosis: Good  Assessment Details:  tolerated evaluation with no issues providing good effort and willingness to participate . Patient expresses willingness to work with therapy to achieve goals set today.     Plan  From a physical therapy perspective, the patient would benefit from: Skilled Rehab Services    Planned therapy interventions include: Therapeutic exercise, Therapeutic activities, Neuromuscular re-education, and Manual therapy.            Visit Frequency: 3 times Per Week for 6 Weeks.       This plan was discussed with Patient.   Discussion participants: Agreed Upon Plan of Care             The patient's spiritual, cultural, and educational needs were considered, and the patient is agreeable to the plan of care and goals.     Education  Education was done with Patient. The patient's learning style includes Listening. The patient Verbalizes understanding.         Discussed fall prevention and safety        Goals:   Active       Flexibility        patient will improve bilateral hamstring flexibility to 75% of normal        Start:  07/03/25    Expected End:  08/29/25               Functional outcome       Patient will show a significant change in FOTO > 5 points  patient-reported outcome tool to demonstrate subjective improvement       Start:  07/03/25    Expected End:  08/29/25            Patient stated goal:   I want to be able to return to the gym and maintain lower extremity strength        Start:  07/03/25    Expected End:   08/29/25            Patient will demonstrate independence in home program for support of progression       Start:  07/03/25    Expected End:  08/29/25               Strength       Patient will demonstrate 4/5 abdominal strength       Start:  07/03/25    Expected End:  08/29/25            Patient will demonstrate 4/5 spinal strength       Start:  07/03/25    Expected End:  08/29/25            Patient will achieve right hip flexion strength of 3+/5       Start:  07/03/25    Expected End:  08/29/25            Patient will achieve right hip extension strength of 4+/5       Start:  07/03/25    Expected End:  08/29/25            Patient will achieve right hip abduction strength of 3+/5       Start:  07/03/25    Expected End:  08/29/25            Patient will achieve right hip adduction strength of 4+/5       Start:  07/03/25    Expected End:  08/29/25            Patient will achieve right hip external rotation strength of 4/5 in 90 degrees hip flexion       Start:  07/03/25    Expected End:  08/29/25            Patient will achieve right hip internal rotation strength of 4/5 in 90 degrees hip flexion       Start:  07/03/25    Expected End:  08/29/25            Patient will achieve right knee flexion strength of 4/5       Start:  07/03/25    Expected End:  08/29/25            Patient will achieve right knee extension strength of 4+/5       Start:  07/03/25    Expected End:  08/29/25            Patient will achieve right ankle dorsiflexion strength of 3+/5       Start:  07/03/25    Expected End:  08/29/25            Patient will achieve right ankle plantar flexion strength of 3/5       Start:  07/03/25    Expected End:  08/29/25                John Devi PT

## 2025-07-03 NOTE — LETTER
July 3, 2025  Ankit Khanna Jr., MD  [address]  [place] [address]      To whom it may concern,     The attached plan of care is being sent to you for review and reference.    You may indicate your approval by signing the document electronically, or by faxing/mailing a signed copy of the final page of this document back to the attention of John Devi, PT:         Plan of Care 7/3/25   Effective from: 7/3/2025  Effective to: 8/29/2025    Active  Plan ID: [id number]           Participants as of 7/3/2025    Name Type Comments Contact Info    Ankit Khanna Jr., MD PCP - General  [phone]    John Devi, PT Physical Therapist        Last Plan Note     Author: John Devi, PT Status: Signed Last edited: 7/3/2025  2:15 PM         Outpatient Rehab    Physical Therapy Evaluation (only)    Patient Name: Robert Lubin  MRN: [id number]  YOB: 1970  Encounter Date: 7/3/2025    Therapy Diagnosis:   Encounter Diagnoses   Name Primary?    Muscle weakness (generalized) Yes    Decreased strength of lower extremity     Decreased strength of trunk and back     Impaired flexibility of lower extremity     Balance problems     Impaired proprioception      Physician: AILYN Trinidad*    Physician Orders: Eval and Treat  Medical Diagnosis: Muscle weakness (generalized)  Surgical Diagnosis: Not applicable for this Episode  Surgical Date: Not applicable for this Episode  Days Since Last Surgery: Not applicable for this Episode    Visit # / Visits Authorized:  1 / 1  Insurance Authorization Period: 6/30/2025 to 6/30/2026  Date of Evaluation: 7/3/2025  Plan of Care Certification: 7/3/2025 to TO BE DETERMINED      Time In: 1418   Time Out: 1526  Total Time (in minutes): 68   Total Billable Time (in minutes):      Intake Outcome Measure for FOTO Survey    Therapist reviewed FOTO scores for Robert Lubin on 7/3/2025.   FOTO report - see Media section or  FOTO account episode details.     Intake Score: 73%    Precautions:       Subjective   History of Present Illness   is a 54 y.o. male who reports to physical therapy with a chief concern of Bilateral LE weakness R > L.     The patient reports a medical diagnosis of M62.81 Muscular weakness. The patient has experienced this issue since 06/30/25.   Diagnostic tests related to this condition: MRI studies.   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 In 1991 he underwent surgery to remove a tumor from the spine at L4 resulting in right lower extremity weakness and drop foot.  In 2014 he underwent surgery to remove a tumor from the spine at T8 resulting in ongoing right lower extremity weakness.  states that he was doing fine until about 4 years ago when he began noticing a significant decline in the both lower extremities right more than left. He has participated in 3 clinical trials at [facility] and lost 44 lbs in the past 2 months. 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 His kidney function is reduced to 30% at this time. Doctors give him a life expectancy of 3 years.  has been having increasing difficulty maintaining balance and feel as recent as two weeks ago due to his legs giving out. His goal is to increase overall strength and improve functional mobility.     Activities of Daily Living  Social history was obtained from Patient.    General Prior Level of Function Comments: Independent  General Current Level of Function Comments: Independent  Patient Responsibilities: Community mobility, Driving, Financial management, Health management, Home management, Shopping, Yard work, Personal ADL    Previously independent with activities of  daily living? Yes     Currently independent with activities of daily living? Yes          Previously independent with instrumental activities of daily living? Yes     Currently independent with instrumental activities of daily living? Yes              Pain     Patient reports a current pain level of 0/10. Pain at best is reported as 0/10. Pain at worst is reported as 7/10.   Location: bilateral lower extremities right > left  Clinical Progression (since onset): Worsening  Pain Qualities: Aching  Pain-Relieving Factors: Rest, Relaxation  Pain-Aggravating Factors: Walking, Stair climbing         Living Arrangements  Living Situation  Housing: Home independently  Living Arrangements: Spouse/significant other  Support Systems: Spouse/significant other    Home Setup  Type of Structure: House  Home Access: Level entry  Number of Levels in Home: One level        Employment      Retired pharmacist       Past Medical History/Physical Systems Review:   Robert Lubin  has a past medical history of Von Hippel-Lindau disease.    Robert Lubin  has a past surgical history that includes Left heart catheterization (Left, 6/6/2025).     has a current medication list which includes the following prescription(s): apixaban, aspirin, brimonidine 0.2%, cabometyx, calcitriol, cholecalciferol (vitamin d3), cyclosporine, fludrocortisone, gabapentin, hydrocodone-acetaminophen, hydrocortisone, levothyroxine, methadone, midodrine, neomycin-polymyxin-dexamethasone, polyethylene glycol, and senna.    Review of patient's allergies indicates:  No Known Allergies     Objective   Posture        Shoulders are Elevated. Right scapula is: Elevated         Right knee position is: Genu Recurvatum       Lower Extremity Sensation  General Lumbar/Lower Extremity Sensation  Impaired: Right  Right Lumbar/Lower Extremity Sensation  Absent: Light Touch, Sharp/Dull Discrimination, Static Two Point Discrimination, Dynamic Two Point Discrimination, and  Proprioception  Right Lumbar/Lower Extremity Sensation Light Touch Comment: unable to feel in right calf and lower leg                   Hip Range of Motion    Within normal limits for all ranges     Knee Range of Motion    Within normal limits for all ranges     Ankle/Foot Range of Motion   Right Ankle/Foot   Active (deg) Passive (deg) Pain   Dorsiflexion (KE) 12       Dorsiflexion (KF)         Plantar Flexion         Ankle Inversion         Ankle Eversion         Subtalar Inversion         Subtalar Eversion         Great Toe MTP Flexion         Great Toe MTP Extension         Great Toe IP Flexion                              Hip Strength - Planes of Motion   Right Strength Right Pain Left Strength Left  Pain   Flexion (L2) 2+   4+     Extension 3+   4+     ABduction 2+   4     ADduction 3-   4+     Internal Rotation 3   5     External Rotation 3+   5         Knee Strength   Right Strength Right Pain Left Strength Left  Pain   Flexion (S2) 3-   5     Prone Flexion 3-   5     Extension (L3) 4   5            Ankle/Foot Strength - Planes of Motion   Right Strength Right Pain Left Strength Left  Pain   Dorsiflexion (L4) 2+   5     Plantar Flexion (S1) 2+   5     Inversion           Eversion           Great Toe Flexion           Great Toe Extension (L5)           Lesser Toes Flexion           Lesser Toes Extension                  Transfers Assessment  Sit to Stand Assistance: Independent      Fall Risk  Functional mobility test results suggest the patient is: At Risk for Falls  Four Stage Balance Test  Narrow Base of Support: 30 sec sec              Single Leg Stand - Right Foot: 0 sec  Single Leg Stand - Left Foot: 10 sec  MCTSIB : 2/4     30/30/0/0    Sit to Stand Testing      The patient completed 14 repetitions of a sit to stand transfer in 30 seconds.        Squat Testing  The patient completed 3 squat repetitions.  Observations  Right Side: Limited Ankle Dorsiflexion             Ambulation Assistance  Required  Surface With  Assistive Device Without Assistive Device Details   Level   Independent      Uneven   Independent     Curb           Stairs Assistance Required   Assistance Level Upper Extremity Support Pattern   Ascending Independent Two rails Reciprocal   Descending Independent Two rails Non-reciprocal        Gait Analysis  Walking Speed: Increased    Right Foot Contact Pattern: Heel to toe       Ankle/Foot Observations During Gait  Right: Foot Drop  Gait Analysis Details  Foot drop increases with fatigue          Time Entry(in minutes):  PT Evaluation (Complex) Time Entry: 68    Assessment & Plan   Assessment   presents with a condition of Moderate complexity.   Presentation of Symptoms: Stable  Will Comorbidities Impact Care: Yes  Receiving chemo from cancer treatments    Functional Limitations: Activity tolerance, Ambulating on uneven surfaces, Community integration, Decreased ambulation distance/endurance, Maintaining balance, Increased risk of fall, Proprioception  Impairments: Impaired physical strength, Safety issue, Impaired balance, Abnormal muscle tone  Personal Factors Affecting Prognosis: Physical limitations    Patient Goal for Therapy (PT): I would like to improve my leg strength and establish an exercise program to maintain mobility  Prognosis: Good  Assessment Details:  tolerated evaluation with no issues providing good effort and willingness to participate . Patient expresses willingness to work with therapy to achieve goals set today.     Plan  From a physical therapy perspective, the patient would benefit from: Skilled Rehab Services    Planned therapy interventions include: Therapeutic exercise, Therapeutic activities, Neuromuscular re-education, and Manual therapy.            Visit Frequency: 3 times Per Week for 6 Weeks.       This plan was discussed with Patient.   Discussion participants: Agreed Upon Plan of Care             The patient's spiritual, cultural, and  educational needs were considered, and the patient is agreeable to the plan of care and goals.     Education  Education was done with Patient. The patient's learning style includes Listening. The patient Verbalizes understanding.         Discussed fall prevention and safety        Goals:   Active       Flexibility        patient will improve bilateral hamstring flexibility to 75% of normal        Start:  07/03/25    Expected End:  08/29/25               Functional outcome       Patient will show a significant change in FOTO > 5 points  patient-reported outcome tool to demonstrate subjective improvement       Start:  07/03/25    Expected End:  08/29/25            Patient stated goal:   I want to be able to return to the gym and maintain lower extremity strength        Start:  07/03/25    Expected End:  08/29/25            Patient will demonstrate independence in home program for support of progression       Start:  07/03/25    Expected End:  08/29/25               Strength       Patient will demonstrate 4/5 abdominal strength       Start:  07/03/25    Expected End:  08/29/25            Patient will demonstrate 4/5 spinal strength       Start:  07/03/25    Expected End:  08/29/25            Patient will achieve right hip flexion strength of 3+/5       Start:  07/03/25    Expected End:  08/29/25            Patient will achieve right hip extension strength of 4+/5       Start:  07/03/25    Expected End:  08/29/25            Patient will achieve right hip abduction strength of 3+/5       Start:  07/03/25    Expected End:  08/29/25            Patient will achieve right hip adduction strength of 4+/5       Start:  07/03/25    Expected End:  08/29/25            Patient will achieve right hip external rotation strength of 4/5 in 90 degrees hip flexion       Start:  07/03/25    Expected End:  08/29/25            Patient will achieve right hip internal rotation strength of 4/5 in 90 degrees hip flexion       Start:  07/03/25     Expected End:  08/29/25            Patient will achieve right knee flexion strength of 4/5       Start:  07/03/25    Expected End:  08/29/25            Patient will achieve right knee extension strength of 4+/5       Start:  07/03/25    Expected End:  08/29/25            Patient will achieve right ankle dorsiflexion strength of 3+/5       Start:  07/03/25    Expected End:  08/29/25            Patient will achieve right ankle plantar flexion strength of 3/5       Start:  07/03/25    Expected End:  08/29/25                John Devi, PT               Sincerely,      John Devi, PT  [facility]                                                            Dear John Devi, PT,    RE: Mr. Robert Lubin, MRN: [id number]    I certify that I have reviewed the attached plan of care and agree to the details within.        ___________________________  ___________________________  Provider Printed Name   Provider Signed Name      ___________________________  Date and Time

## 2025-07-15 ENCOUNTER — CLINICAL SUPPORT (OUTPATIENT)
Dept: REHABILITATION | Facility: HOSPITAL | Age: 55
End: 2025-07-15
Payer: COMMERCIAL

## 2025-07-15 DIAGNOSIS — R29.898 DECREASED STRENGTH OF TRUNK AND BACK: ICD-10-CM

## 2025-07-15 DIAGNOSIS — R29.898 DECREASED STRENGTH OF LOWER EXTREMITY: Primary | ICD-10-CM

## 2025-07-15 DIAGNOSIS — R26.89 BALANCE PROBLEMS: ICD-10-CM

## 2025-07-15 DIAGNOSIS — R29.898 IMPAIRED FLEXIBILITY OF LOWER EXTREMITY: ICD-10-CM

## 2025-07-15 DIAGNOSIS — R29.818 IMPAIRED PROPRIOCEPTION: ICD-10-CM

## 2025-07-15 PROCEDURE — 97140 MANUAL THERAPY 1/> REGIONS: CPT

## 2025-07-15 PROCEDURE — 97110 THERAPEUTIC EXERCISES: CPT

## 2025-07-15 PROCEDURE — 97112 NEUROMUSCULAR REEDUCATION: CPT

## 2025-07-15 NOTE — PROGRESS NOTES
Outpatient Rehab    Physical Therapy Visit    Patient Name: Robert Lubin  MRN: [id number]  YOB: 1970  Encounter Date: 7/15/2025    Therapy Diagnosis:   Encounter Diagnoses   Name Primary?    Decreased strength of lower extremity Yes    Decreased strength of trunk and back     Impaired flexibility of lower extremity     Balance problems     Impaired proprioception      Physician: AILYN Trinidad*    Physician Orders: Eval and Treat  Medical Diagnosis: Muscle weakness (generalized)  Surgical Diagnosis: Not applicable for this Episode   Surgical Date: Not applicable for this Episode  Days Since Last Surgery: Not applicable for this Episode    Visit # / Visits Authorized:  1 / 18  Insurance Authorization Period: 7/7/2025 to 8/29/2025  Date of Evaluation: 7/3/2025  Plan of Care Certification: 7/3/2025 to 8/29/2025      PT/PTA: PT   Number of PTA visits since last PT visit:0  Time In: 0840   Time Out: 0930  Total Time (in minutes): 50   Total Billable Time (in minutes): 45    FOTO:  Intake Score: 73%      Subjective   The pt reports RLE weakness as compared to L and overall decreased strength/endurance..  Pain reported as 2/10. R Hip    Objective            Treatment:  Therapeutic Exercise  TE 1: Leg press for LE strength (BLE 80#) (LLE 40#) and (RLE 30#) 30 reps of each  TE 2: split stance pall of press with rotation and march with green TB resistance for balance and core strength  TE 3: quadruped hip abd/ext for core and hip stability  Manual Therapy  MT 1: (B) HS, piri, and hip add stretch  MT 2: hip/knee flexion/extension with manual PT resistance  MT 3: SLR (flexion/neutral) with manual PT resistance  Balance/Neuromuscular Re-Education  NMR 1: glute re-ed with bridges with add ball squeeze and 5# chest press  NMR 2: SLR for quad re-ed with triceps extension 12#    Time Entry(in minutes):  Manual Therapy Time Entry: 15  Neuromuscular Re-Education Time Entry: 15  Therapeutic Exercise Time  Entry: 15    Assessment & Plan   Assessment: The pt required tactile and verbal cueing during quadruped interventions for pelvic stability and split stance pall of press with rotation due to sway and balance deficits. He had good tolerance with therex and during SL Leg press interventions, needed to regress RLE weight to 30# as compared to completing L SL with 40# resistance.        The patient will continue to benefit from skilled outpatient physical therapy in order to address the deficits listed in the problem list on the initial evaluation, provide patient and family education, and maximize the patients level of independence in the home and community environments.     The patient's spiritual, cultural, and educational needs were considered, and the patient is agreeable to the plan of care and goals.           Plan: Ct with current PT POC.    Goals:   Active       Flexibility        patient will improve bilateral hamstring flexibility to 75% of normal        Start:  07/03/25    Expected End:  08/29/25               Functional outcome       Patient will show a significant change in FOTO > 5 points  patient-reported outcome tool to demonstrate subjective improvement       Start:  07/03/25    Expected End:  08/29/25            Patient stated goal:   I want to be able to return to the gym and maintain lower extremity strength        Start:  07/03/25    Expected End:  08/29/25            Patient will demonstrate independence in home program for support of progression       Start:  07/03/25    Expected End:  08/29/25               Strength       Patient will demonstrate 4/5 abdominal strength       Start:  07/03/25    Expected End:  08/29/25            Patient will demonstrate 4/5 spinal strength       Start:  07/03/25    Expected End:  08/29/25            Patient will achieve right hip flexion strength of 3+/5       Start:  07/03/25    Expected End:  08/29/25            Patient will achieve right hip extension  strength of 4+/5       Start:  07/03/25    Expected End:  08/29/25            Patient will achieve right hip abduction strength of 3+/5       Start:  07/03/25    Expected End:  08/29/25            Patient will achieve right hip adduction strength of 4+/5       Start:  07/03/25    Expected End:  08/29/25            Patient will achieve right hip external rotation strength of 4/5 in 90 degrees hip flexion       Start:  07/03/25    Expected End:  08/29/25            Patient will achieve right hip internal rotation strength of 4/5 in 90 degrees hip flexion       Start:  07/03/25    Expected End:  08/29/25            Patient will achieve right knee flexion strength of 4/5       Start:  07/03/25    Expected End:  08/29/25            Patient will achieve right knee extension strength of 4+/5       Start:  07/03/25    Expected End:  08/29/25            Patient will achieve right ankle dorsiflexion strength of 3+/5       Start:  07/03/25    Expected End:  08/29/25            Patient will achieve right ankle plantar flexion strength of 3/5       Start:  07/03/25    Expected End:  08/29/25                Олег Duque PT

## 2025-07-16 ENCOUNTER — CLINICAL SUPPORT (OUTPATIENT)
Dept: REHABILITATION | Facility: HOSPITAL | Age: 55
End: 2025-07-16
Payer: COMMERCIAL

## 2025-07-16 DIAGNOSIS — R29.898 DECREASED STRENGTH OF LOWER EXTREMITY: Primary | ICD-10-CM

## 2025-07-16 DIAGNOSIS — R29.898 IMPAIRED FLEXIBILITY OF LOWER EXTREMITY: ICD-10-CM

## 2025-07-16 DIAGNOSIS — M62.81 MUSCLE WEAKNESS (GENERALIZED): ICD-10-CM

## 2025-07-16 DIAGNOSIS — R29.898 DECREASED STRENGTH OF TRUNK AND BACK: ICD-10-CM

## 2025-07-16 DIAGNOSIS — R26.89 BALANCE PROBLEMS: ICD-10-CM

## 2025-07-16 DIAGNOSIS — R29.818 IMPAIRED PROPRIOCEPTION: ICD-10-CM

## 2025-07-16 PROCEDURE — 97140 MANUAL THERAPY 1/> REGIONS: CPT

## 2025-07-16 PROCEDURE — 97112 NEUROMUSCULAR REEDUCATION: CPT

## 2025-07-16 PROCEDURE — 97530 THERAPEUTIC ACTIVITIES: CPT

## 2025-07-16 NOTE — PROGRESS NOTES
Outpatient Rehab    Physical Therapy Visit    Patient Name: Robert Lubin  MRN: [id number]  YOB: 1970  Encounter Date: 7/16/2025    Therapy Diagnosis:   Encounter Diagnoses   Name Primary?    Decreased strength of lower extremity Yes    Decreased strength of trunk and back     Impaired flexibility of lower extremity     Balance problems     Impaired proprioception     Muscle weakness (generalized)      Physician: AILYN Trinidad*    Physician Orders: Eval and Treat  Medical Diagnosis: Muscle weakness (generalized)  Surgical Diagnosis: Not applicable for this Episode   Surgical Date: Not applicable for this Episode  Days Since Last Surgery: Not applicable for this Episode    Visit # / Visits Authorized:  2 / 18  Insurance Authorization Period: 7/7/2025 to 8/29/2025  Date of Evaluation: 7/3/2025  Plan of Care Certification: 7/3/2025 to 8/29/2025      PT/PTA: PT   Number of PTA visits since last PT visit:0  Time In: 1245   Time Out: 1330  Total Time (in minutes): 45   Total Billable Time (in minutes): 40    FOTO:  Intake Score: 73%      Subjective   The pt denies pain today, but does report some soreness following yesterday's PT session. He also states his BP is lower than usual due to a change in medication recently..  Pain reported as 0/10.      Objective            Treatment:  Manual Therapy  MT 1: (B) HS, piri, and hip add stretch  MT 2: hip IR/ER PROM 90/90  Balance/Neuromuscular Re-Education  NMR 1: prone superman's alternating UE/LE for posterior chain re-ed  NMR 2: HS re-ed with prone HS curls (5#)  Therapeutic Activity  TA 1: standing ceiling reach (5#) on foam pad for balance and stability  TA 2: lateral cone step overs with chest press for balance and proprioception  TA 3: sit to stands with yellow med ball for balance and strength    Time Entry(in minutes):  Manual Therapy Time Entry: 10  Neuromuscular Re-Education Time Entry: 15  Therapeutic Activity Time Entry: 15    Assessment &  Plan   Assessment: The pt required frequent rest breaks in between exercises due to fatigue. He had good stability observed with lateral cone step overs and standing ceiling reaches on foam pad. He would ct to benefit from further skilled PT interventions.       The patient will continue to benefit from skilled outpatient physical therapy in order to address the deficits listed in the problem list on the initial evaluation, provide patient and family education, and maximize the patients level of independence in the home and community environments.     The patient's spiritual, cultural, and educational needs were considered, and the patient is agreeable to the plan of care and goals.           Plan: Ct with current PT POC.    Goals:   Active       Flexibility        patient will improve bilateral hamstring flexibility to 75% of normal        Start:  07/03/25    Expected End:  08/29/25               Functional outcome       Patient will show a significant change in FOTO > 5 points  patient-reported outcome tool to demonstrate subjective improvement       Start:  07/03/25    Expected End:  08/29/25            Patient stated goal:   I want to be able to return to the gym and maintain lower extremity strength        Start:  07/03/25    Expected End:  08/29/25            Patient will demonstrate independence in home program for support of progression       Start:  07/03/25    Expected End:  08/29/25               Strength       Patient will demonstrate 4/5 abdominal strength       Start:  07/03/25    Expected End:  08/29/25            Patient will demonstrate 4/5 spinal strength       Start:  07/03/25    Expected End:  08/29/25            Patient will achieve right hip flexion strength of 3+/5       Start:  07/03/25    Expected End:  08/29/25            Patient will achieve right hip extension strength of 4+/5       Start:  07/03/25    Expected End:  08/29/25            Patient will achieve right hip abduction strength  of 3+/5       Start:  07/03/25    Expected End:  08/29/25            Patient will achieve right hip adduction strength of 4+/5       Start:  07/03/25    Expected End:  08/29/25            Patient will achieve right hip external rotation strength of 4/5 in 90 degrees hip flexion       Start:  07/03/25    Expected End:  08/29/25            Patient will achieve right hip internal rotation strength of 4/5 in 90 degrees hip flexion       Start:  07/03/25    Expected End:  08/29/25            Patient will achieve right knee flexion strength of 4/5       Start:  07/03/25    Expected End:  08/29/25            Patient will achieve right knee extension strength of 4+/5       Start:  07/03/25    Expected End:  08/29/25            Patient will achieve right ankle dorsiflexion strength of 3+/5       Start:  07/03/25    Expected End:  08/29/25            Patient will achieve right ankle plantar flexion strength of 3/5       Start:  07/03/25    Expected End:  08/29/25                Олег Duque PT

## 2025-07-17 DIAGNOSIS — Z13.820 SPECIAL SCREENING FOR OSTEOPOROSIS: Primary | ICD-10-CM

## 2025-07-21 ENCOUNTER — CLINICAL SUPPORT (OUTPATIENT)
Dept: REHABILITATION | Facility: HOSPITAL | Age: 55
End: 2025-07-21
Payer: COMMERCIAL

## 2025-07-21 ENCOUNTER — HOSPITAL ENCOUNTER (OUTPATIENT)
Dept: RADIOLOGY | Facility: HOSPITAL | Age: 55
Discharge: HOME | End: 2025-07-21
Attending: INTERNAL MEDICINE
Payer: COMMERCIAL

## 2025-07-21 DIAGNOSIS — R29.898 DECREASED STRENGTH OF TRUNK AND BACK: ICD-10-CM

## 2025-07-21 DIAGNOSIS — R29.898 DECREASED STRENGTH OF LOWER EXTREMITY: Primary | ICD-10-CM

## 2025-07-21 DIAGNOSIS — M81.0 OSTEOPOROSIS: ICD-10-CM

## 2025-07-21 DIAGNOSIS — Z13.820 SPECIAL SCREENING FOR OSTEOPOROSIS: ICD-10-CM

## 2025-07-21 DIAGNOSIS — R26.89 BALANCE PROBLEMS: ICD-10-CM

## 2025-07-21 DIAGNOSIS — R29.818 IMPAIRED PROPRIOCEPTION: ICD-10-CM

## 2025-07-21 DIAGNOSIS — R29.898 IMPAIRED FLEXIBILITY OF LOWER EXTREMITY: ICD-10-CM

## 2025-07-21 PROCEDURE — 97112 NEUROMUSCULAR REEDUCATION: CPT | Mod: CQ

## 2025-07-21 PROCEDURE — 77080 DXA BONE DENSITY AXIAL: CPT | Mod: TC

## 2025-07-21 PROCEDURE — 97110 THERAPEUTIC EXERCISES: CPT | Mod: CQ

## 2025-07-21 PROCEDURE — 77081 DXA BONE DENSITY APPENDICULR: CPT | Mod: TC

## 2025-07-21 NOTE — PROGRESS NOTES
Outpatient Rehab    Physical Therapy Visit    Patient Name: Robert Lubin  MRN: [id number]  YOB: 1970  Encounter Date: 7/21/2025    Therapy Diagnosis:   Encounter Diagnoses   Name Primary?    Decreased strength of lower extremity Yes    Decreased strength of trunk and back     Impaired flexibility of lower extremity     Balance problems     Impaired proprioception      Physician: AILYN Trinidad*    Physician Orders: Eval and Treat  Medical Diagnosis: Muscle weakness (generalized)  Surgical Diagnosis: Not applicable for this Episode   Surgical Date: Not applicable for this Episode  Days Since Last Surgery: Not applicable for this Episode    Visit # / Visits Authorized:  3 / 18  Insurance Authorization Period: 7/7/2025 to 8/29/2025  Date of Evaluation: 7/3/2025  Plan of Care Certification: 7/3/2025 to 8/29/2025      PT/PTA: PTA   Number of PTA visits since last PT visit:1  Time In: 0845   Time Out: 0930  Total Time (in minutes): 45   Total Billable Time (in minutes): 45    FOTO:  Intake Score (%): 73  Survey Score 2 (%): 73  Survey Score 3 (%): Not applicable for this Episode    Precautions:         Subjective   patient reports no pain today. He had some issues with vision in his L eye and could not see well last week, resulting in him cancelling a therapy appointment. Eye problem is mostly resolved and he is feeling better today..  Pain reported as 0/10. R Hip    Objective            Treatment:  Therapeutic Exercise  TE 4: B LE strengthening in supine and side-lying  TE 5: NuStep warm-up  Balance/Neuromuscular Re-Education  NMR 2: HS re-ed with prone HS curls (5#)  NMR 3: Glute re-ed with bridges  NMR 4: Core re-ed with abdominal press using stability ball  NMR 5: Quad re-ed with SAQ    Time Entry(in minutes):  Neuromuscular Re-Education Time Entry: 25  Therapeutic Exercise Time Entry: 20    Assessment & Plan   Assessment:  demonstrated a positive response after session today as  evidenced by ability to complete all exercises with good effort and no report of increased pain. Performed lower extremity and core strengthening and re-education to address goals of strengthening and functional mobility, and NuStep to improve conditioning with good tolerance. FOTO score remains the same today.   Evaluation/Treatment Tolerance: Patient tolerated treatment well    The patient will continue to benefit from skilled outpatient physical therapy in order to address the deficits listed in the problem list on the initial evaluation, provide patient and family education, and maximize the patients level of independence in the home and community environments.     The patient's spiritual, cultural, and educational needs were considered, and the patient is agreeable to the plan of care and goals.           Plan: Ct with current PT POC.    Goals:   Active       Flexibility        patient will improve bilateral hamstring flexibility to 75% of normal  (Progressing)       Start:  07/03/25    Expected End:  08/29/25               Functional outcome       Patient will show a significant change in FOTO > 5 points  patient-reported outcome tool to demonstrate subjective improvement (Progressing)       Start:  07/03/25    Expected End:  08/29/25            Patient stated goal:   I want to be able to return to the gym and maintain lower extremity strength  (Progressing)       Start:  07/03/25    Expected End:  08/29/25            Patient will demonstrate independence in home program for support of progression (Progressing)       Start:  07/03/25    Expected End:  08/29/25               Strength       Patient will demonstrate 4/5 abdominal strength (Progressing)       Start:  07/03/25    Expected End:  08/29/25            Patient will demonstrate 4/5 spinal strength (Progressing)       Start:  07/03/25    Expected End:  08/29/25            Patient will achieve right hip flexion strength of 3+/5 (Progressing)       Start:   07/03/25    Expected End:  08/29/25            Patient will achieve right hip extension strength of 4+/5 (Progressing)       Start:  07/03/25    Expected End:  08/29/25            Patient will achieve right hip abduction strength of 3+/5 (Progressing)       Start:  07/03/25    Expected End:  08/29/25            Patient will achieve right hip adduction strength of 4+/5 (Progressing)       Start:  07/03/25    Expected End:  08/29/25            Patient will achieve right hip external rotation strength of 4/5 in 90 degrees hip flexion (Progressing)       Start:  07/03/25    Expected End:  08/29/25            Patient will achieve right hip internal rotation strength of 4/5 in 90 degrees hip flexion (Progressing)       Start:  07/03/25    Expected End:  08/29/25            Patient will achieve right knee flexion strength of 4/5 (Progressing)       Start:  07/03/25    Expected End:  08/29/25            Patient will achieve right knee extension strength of 4+/5 (Progressing)       Start:  07/03/25    Expected End:  08/29/25            Patient will achieve right ankle dorsiflexion strength of 3+/5 (Progressing)       Start:  07/03/25    Expected End:  08/29/25            Patient will achieve right ankle plantar flexion strength of 3/5 (Progressing)       Start:  07/03/25    Expected End:  08/29/25                Abdirahman Mccoy PTA

## 2025-07-22 ENCOUNTER — CLINICAL SUPPORT (OUTPATIENT)
Dept: REHABILITATION | Facility: HOSPITAL | Age: 55
End: 2025-07-22
Payer: COMMERCIAL

## 2025-07-22 DIAGNOSIS — R29.898 IMPAIRED FLEXIBILITY OF LOWER EXTREMITY: ICD-10-CM

## 2025-07-22 DIAGNOSIS — R29.818 IMPAIRED PROPRIOCEPTION: ICD-10-CM

## 2025-07-22 DIAGNOSIS — R29.898 DECREASED STRENGTH OF LOWER EXTREMITY: Primary | ICD-10-CM

## 2025-07-22 DIAGNOSIS — R26.89 BALANCE PROBLEMS: ICD-10-CM

## 2025-07-22 DIAGNOSIS — R29.898 DECREASED STRENGTH OF TRUNK AND BACK: ICD-10-CM

## 2025-07-22 PROCEDURE — 97112 NEUROMUSCULAR REEDUCATION: CPT

## 2025-07-22 PROCEDURE — 97110 THERAPEUTIC EXERCISES: CPT

## 2025-07-22 PROCEDURE — 97140 MANUAL THERAPY 1/> REGIONS: CPT

## 2025-07-22 NOTE — PROGRESS NOTES
Outpatient Rehab    Physical Therapy Visit    Patient Name: Robert Lubin  MRN: [id number]  YOB: 1970  Encounter Date: 7/22/2025    Therapy Diagnosis:   Encounter Diagnoses   Name Primary?    Decreased strength of lower extremity Yes    Decreased strength of trunk and back     Impaired flexibility of lower extremity     Balance problems     Impaired proprioception      Physician: AILYN Trinidad*    Physician Orders: Eval and Treat  Medical Diagnosis: Muscle weakness (generalized)  Surgical Diagnosis: Not applicable for this Episode   Surgical Date: Not applicable for this Episode  Days Since Last Surgery: Not applicable for this Episode    Visit # / Visits Authorized:  4 / 18  Insurance Authorization Period: 7/7/2025 to 8/29/2025  Date of Evaluation: 7/3/2025  Plan of Care Certification: 7/3/2025 to 8/29/2025      PT/PTA: PT   Number of PTA visits since last PT visit:1  Time In: 1417   Time Out: 1500  Total Time (in minutes): 43   Total Billable Time (in minutes): 40    FOTO:  Intake Score (%): 73  Survey Score 2 (%): 73        Subjective   The pt reports no real pain today and that he is feeling okay overall this afternoon,.  Pain reported as 0/10.      Objective            Treatment:  Therapeutic Exercise  TE 1: Leg press for LE strength (BLE 80#) (LLE 40#) and (RLE 30#) 30 reps of each  TE 2: split stance pall of press with rotation and march with green TB resistance for balance and core strength  Manual Therapy  MT 1: (B) HS, piri, and hip add stretch  MT 2: hip IR/ER PROM 90/90  MT 3: prone quad and HF stretch  Balance/Neuromuscular Re-Education  NMR 1: prone superman's alternating UE/LE for posterior chain re-ed  NMR 2: HS re-ed with prone HS curls (5#)  NMR 3: Glute re-ed with bridges    Time Entry(in minutes):  Manual Therapy Time Entry: 10  Neuromuscular Re-Education Time Entry: 15  Therapeutic Exercise Time Entry: 15    Assessment & Plan   Assessment: The pt had good tolerance  with therex today, but did need a regression to 10# (from 12#) resistance with triceps extension with SLR. He still had a difference in SL leg press weight (R) leg press (30#) as compared to completing 40# with LLE when completing for 30 reps. He would ct to benefit from further skilled PT interventions.        The patient will continue to benefit from skilled outpatient physical therapy in order to address the deficits listed in the problem list on the initial evaluation, provide patient and family education, and maximize the patients level of independence in the home and community environments.     The patient's spiritual, cultural, and educational needs were considered, and the patient is agreeable to the plan of care and goals.           Plan: Ct with current PT POC.    Goals:   Active       Flexibility        patient will improve bilateral hamstring flexibility to 75% of normal  (Progressing)       Start:  07/03/25    Expected End:  08/29/25               Functional outcome       Patient will show a significant change in FOTO > 5 points  patient-reported outcome tool to demonstrate subjective improvement (Progressing)       Start:  07/03/25    Expected End:  08/29/25            Patient stated goal:   I want to be able to return to the gym and maintain lower extremity strength  (Progressing)       Start:  07/03/25    Expected End:  08/29/25            Patient will demonstrate independence in home program for support of progression (Progressing)       Start:  07/03/25    Expected End:  08/29/25               Strength       Patient will demonstrate 4/5 abdominal strength (Progressing)       Start:  07/03/25    Expected End:  08/29/25            Patient will demonstrate 4/5 spinal strength (Progressing)       Start:  07/03/25    Expected End:  08/29/25            Patient will achieve right hip flexion strength of 3+/5 (Progressing)       Start:  07/03/25    Expected End:  08/29/25            Patient will achieve  right hip extension strength of 4+/5 (Progressing)       Start:  07/03/25    Expected End:  08/29/25            Patient will achieve right hip abduction strength of 3+/5 (Progressing)       Start:  07/03/25    Expected End:  08/29/25            Patient will achieve right hip adduction strength of 4+/5 (Progressing)       Start:  07/03/25    Expected End:  08/29/25            Patient will achieve right hip external rotation strength of 4/5 in 90 degrees hip flexion (Progressing)       Start:  07/03/25    Expected End:  08/29/25            Patient will achieve right hip internal rotation strength of 4/5 in 90 degrees hip flexion (Progressing)       Start:  07/03/25    Expected End:  08/29/25            Patient will achieve right knee flexion strength of 4/5 (Progressing)       Start:  07/03/25    Expected End:  08/29/25            Patient will achieve right knee extension strength of 4+/5 (Progressing)       Start:  07/03/25    Expected End:  08/29/25            Patient will achieve right ankle dorsiflexion strength of 3+/5 (Progressing)       Start:  07/03/25    Expected End:  08/29/25            Patient will achieve right ankle plantar flexion strength of 3/5 (Progressing)       Start:  07/03/25    Expected End:  08/29/25                Олег Duque PT

## 2025-07-25 ENCOUNTER — CLINICAL SUPPORT (OUTPATIENT)
Dept: REHABILITATION | Facility: HOSPITAL | Age: 55
End: 2025-07-25
Payer: COMMERCIAL

## 2025-07-25 DIAGNOSIS — R29.818 IMPAIRED PROPRIOCEPTION: ICD-10-CM

## 2025-07-25 DIAGNOSIS — R26.89 BALANCE PROBLEMS: ICD-10-CM

## 2025-07-25 DIAGNOSIS — R29.898 DECREASED STRENGTH OF LOWER EXTREMITY: Primary | ICD-10-CM

## 2025-07-25 DIAGNOSIS — R29.898 DECREASED STRENGTH OF TRUNK AND BACK: ICD-10-CM

## 2025-07-25 DIAGNOSIS — R29.898 IMPAIRED FLEXIBILITY OF LOWER EXTREMITY: ICD-10-CM

## 2025-07-25 PROCEDURE — 97110 THERAPEUTIC EXERCISES: CPT | Mod: CQ

## 2025-07-25 PROCEDURE — 97112 NEUROMUSCULAR REEDUCATION: CPT | Mod: CQ

## 2025-07-25 NOTE — PROGRESS NOTES
Outpatient Rehab    Physical Therapy Visit    Patient Name: Robert Lubin  MRN: [id number]  YOB: 1970  Encounter Date: 7/25/2025    Therapy Diagnosis:   Encounter Diagnoses   Name Primary?    Decreased strength of lower extremity Yes    Decreased strength of trunk and back     Impaired flexibility of lower extremity     Balance problems     Impaired proprioception      Physician: AILYN Trinidad*    Physician Orders: Eval and Treat  Medical Diagnosis: Muscle weakness (generalized)  Surgical Diagnosis: Not applicable for this Episode   Surgical Date: Not applicable for this Episode  Days Since Last Surgery: Not applicable for this Episode    Visit # / Visits Authorized:  5 / 18  Insurance Authorization Period: 7/7/2025 to 8/29/2025  Date of Evaluation: 7/3/2025  Plan of Care Certification: 7/3/2025 to 8/29/2025      PT/PTA: PTA   Number of PTA visits since last PT visit:1  Time In: 1545   Time Out: 1630  Total Time (in minutes): 45   Total Billable Time (in minutes): 45    FOTO:  Intake Score (%): 73  Survey Score 2 (%): 73  Survey Score 3 (%): Not applicable for this Episode    Precautions:         Subjective   Patient states he has no pain today, but continues with intermittent numbness in the L LE / foot..  Pain reported as 0/10. R Hip    Objective            Treatment:  Therapeutic Exercise  TE 4: B LE strengthening in supine and side-lying  TE 5: NuStep warm-up  Balance/Neuromuscular Re-Education  NMR 2: HS re-ed with prone HS curls (5#)  NMR 3: Glute re-ed with bridges  NMR 4: Core re-ed with abdominal press using stability ball  NMR 5: Quad re-ed with SAQ    Time Entry(in minutes):  Neuromuscular Re-Education Time Entry: 15  Therapeutic Exercise Time Entry: 30    Assessment & Plan   Assessment:  demonstrated a positive response after session today as evidenced by ability to complete all exercises with good effort and no report of increased pain. Performed lower extremity and  core strengthening and re-education to address goals of strengthening and functional mobility, and NuStep to improve conditioning with good tolerance.   Evaluation/Treatment Tolerance: Patient tolerated treatment well    The patient will continue to benefit from skilled outpatient physical therapy in order to address the deficits listed in the problem list on the initial evaluation, provide patient and family education, and maximize the patients level of independence in the home and community environments.     The patient's spiritual, cultural, and educational needs were considered, and the patient is agreeable to the plan of care and goals.           Plan: Ct with current PT POC.    Goals:   Active       Flexibility        patient will improve bilateral hamstring flexibility to 75% of normal  (Progressing)       Start:  07/03/25    Expected End:  08/29/25               Functional outcome       Patient will show a significant change in FOTO > 5 points  patient-reported outcome tool to demonstrate subjective improvement (Progressing)       Start:  07/03/25    Expected End:  08/29/25            Patient stated goal:   I want to be able to return to the gym and maintain lower extremity strength  (Progressing)       Start:  07/03/25    Expected End:  08/29/25            Patient will demonstrate independence in home program for support of progression (Progressing)       Start:  07/03/25    Expected End:  08/29/25               Strength       Patient will demonstrate 4/5 abdominal strength (Progressing)       Start:  07/03/25    Expected End:  08/29/25            Patient will demonstrate 4/5 spinal strength (Progressing)       Start:  07/03/25    Expected End:  08/29/25            Patient will achieve right hip flexion strength of 3+/5 (Progressing)       Start:  07/03/25    Expected End:  08/29/25            Patient will achieve right hip extension strength of 4+/5 (Progressing)       Start:  07/03/25    Expected End:   08/29/25            Patient will achieve right hip abduction strength of 3+/5 (Progressing)       Start:  07/03/25    Expected End:  08/29/25            Patient will achieve right hip adduction strength of 4+/5 (Progressing)       Start:  07/03/25    Expected End:  08/29/25            Patient will achieve right hip external rotation strength of 4/5 in 90 degrees hip flexion (Progressing)       Start:  07/03/25    Expected End:  08/29/25            Patient will achieve right hip internal rotation strength of 4/5 in 90 degrees hip flexion (Progressing)       Start:  07/03/25    Expected End:  08/29/25            Patient will achieve right knee flexion strength of 4/5 (Progressing)       Start:  07/03/25    Expected End:  08/29/25            Patient will achieve right knee extension strength of 4+/5 (Progressing)       Start:  07/03/25    Expected End:  08/29/25            Patient will achieve right ankle dorsiflexion strength of 3+/5 (Progressing)       Start:  07/03/25    Expected End:  08/29/25            Patient will achieve right ankle plantar flexion strength of 3/5 (Progressing)       Start:  07/03/25    Expected End:  08/29/25                Abdirahman Mccoy, PTA

## 2025-07-28 ENCOUNTER — CLINICAL SUPPORT (OUTPATIENT)
Dept: REHABILITATION | Facility: HOSPITAL | Age: 55
End: 2025-07-28
Payer: COMMERCIAL

## 2025-07-28 DIAGNOSIS — R29.898 IMPAIRED FLEXIBILITY OF LOWER EXTREMITY: ICD-10-CM

## 2025-07-28 DIAGNOSIS — M62.81 MUSCLE WEAKNESS (GENERALIZED): ICD-10-CM

## 2025-07-28 DIAGNOSIS — R29.898 DECREASED STRENGTH OF TRUNK AND BACK: ICD-10-CM

## 2025-07-28 DIAGNOSIS — R26.89 BALANCE PROBLEMS: ICD-10-CM

## 2025-07-28 DIAGNOSIS — R29.818 IMPAIRED PROPRIOCEPTION: ICD-10-CM

## 2025-07-28 DIAGNOSIS — R29.898 DECREASED STRENGTH OF LOWER EXTREMITY: Primary | ICD-10-CM

## 2025-07-28 PROCEDURE — 97110 THERAPEUTIC EXERCISES: CPT

## 2025-07-28 PROCEDURE — 97140 MANUAL THERAPY 1/> REGIONS: CPT

## 2025-07-28 PROCEDURE — 97112 NEUROMUSCULAR REEDUCATION: CPT

## 2025-07-28 NOTE — PROGRESS NOTES
Outpatient Rehab    Physical Therapy Visit    Patient Name: Robert Lubin  MRN: [id number]  YOB: 1970  Encounter Date: 7/28/2025    Therapy Diagnosis:   Encounter Diagnoses   Name Primary?    Decreased strength of lower extremity Yes    Decreased strength of trunk and back     Impaired flexibility of lower extremity     Balance problems     Impaired proprioception     Muscle weakness (generalized)      Physician: AILYN Trinidad*    Physician Orders: Eval and Treat  Medical Diagnosis: Muscle weakness (generalized)  Surgical Diagnosis: Not applicable for this Episode   Surgical Date: Not applicable for this Episode  Days Since Last Surgery: Not applicable for this Episode    Visit # / Visits Authorized:  6 / 18  Insurance Authorization Period: 7/7/2025 to 8/29/2025  Date of Evaluation: 7/3/2025  Plan of Care Certification: 7/3/2025 to 8/29/2025      PT/PTA: PT   Number of PTA visits since last PT visit:1  Time In: 1100   Time Out: 1145  Total Time (in minutes): 45   Total Billable Time (in minutes): 40    FOTO:  Intake Score (%): 73  Survey Score 2 (%): 73      Subjective   Pt reports ct numbness to LLE/foot area, but no pain..         Objective            Treatment:  Therapeutic Exercise  TE 1: Leg press for LE strength (BLE 80#) (LLE 40#) and (RLE 30#) 30 reps of each  TE 2: split stance pall of press with rotation and march with green TB resistance for balance and core strength  Manual Therapy  MT 1: (B) HS, piri, and hip add stretch  MT 2: hip IR/ER PROM 90/90  MT 3: prone quad and HF stretch  Balance/Neuromuscular Re-Education  NMR 1: quadruped hip abd/ext for core and hip stability  NMR 2: HS re-ed with prone HS curls (10#)  NMR 3: Glute re-ed with bridges with chest press (10#)    Time Entry(in minutes):  Manual Therapy Time Entry: 15  Neuromuscular Re-Education Time Entry: 15  Therapeutic Exercise Time Entry: 10    Assessment & Plan   Assessment: The pt had good tolerance with therex  today and was progressed to 10# resistance with prone HS curls. He still requires rest breaks due to fatigue and reported ~7/10 fatigue at completion of tx session today. He would ct to benefit from further skilled PT interventions.       The patient will continue to benefit from skilled outpatient physical therapy in order to address the deficits listed in the problem list on the initial evaluation, provide patient and family education, and maximize the patients level of independence in the home and community environments.     The patient's spiritual, cultural, and educational needs were considered, and the patient is agreeable to the plan of care and goals.           Plan: Ct with current PT POC.    Goals:   Active       Flexibility        patient will improve bilateral hamstring flexibility to 75% of normal  (Progressing)       Start:  07/03/25    Expected End:  08/29/25               Functional outcome       Patient will show a significant change in FOTO > 5 points  patient-reported outcome tool to demonstrate subjective improvement (Progressing)       Start:  07/03/25    Expected End:  08/29/25            Patient stated goal:   I want to be able to return to the gym and maintain lower extremity strength  (Progressing)       Start:  07/03/25    Expected End:  08/29/25            Patient will demonstrate independence in home program for support of progression (Progressing)       Start:  07/03/25    Expected End:  08/29/25               Strength       Patient will demonstrate 4/5 abdominal strength (Progressing)       Start:  07/03/25    Expected End:  08/29/25            Patient will demonstrate 4/5 spinal strength (Progressing)       Start:  07/03/25    Expected End:  08/29/25            Patient will achieve right hip flexion strength of 3+/5 (Progressing)       Start:  07/03/25    Expected End:  08/29/25            Patient will achieve right hip extension strength of 4+/5 (Progressing)       Start:  07/03/25     Expected End:  08/29/25            Patient will achieve right hip abduction strength of 3+/5 (Progressing)       Start:  07/03/25    Expected End:  08/29/25            Patient will achieve right hip adduction strength of 4+/5 (Progressing)       Start:  07/03/25    Expected End:  08/29/25            Patient will achieve right hip external rotation strength of 4/5 in 90 degrees hip flexion (Progressing)       Start:  07/03/25    Expected End:  08/29/25            Patient will achieve right hip internal rotation strength of 4/5 in 90 degrees hip flexion (Progressing)       Start:  07/03/25    Expected End:  08/29/25            Patient will achieve right knee flexion strength of 4/5 (Progressing)       Start:  07/03/25    Expected End:  08/29/25            Patient will achieve right knee extension strength of 4+/5 (Progressing)       Start:  07/03/25    Expected End:  08/29/25            Patient will achieve right ankle dorsiflexion strength of 3+/5 (Progressing)       Start:  07/03/25    Expected End:  08/29/25            Patient will achieve right ankle plantar flexion strength of 3/5 (Progressing)       Start:  07/03/25    Expected End:  08/29/25                Олег Duque PT

## 2025-07-30 ENCOUNTER — CLINICAL SUPPORT (OUTPATIENT)
Dept: REHABILITATION | Facility: HOSPITAL | Age: 55
End: 2025-07-30
Payer: COMMERCIAL

## 2025-07-30 DIAGNOSIS — R29.818 IMPAIRED PROPRIOCEPTION: ICD-10-CM

## 2025-07-30 DIAGNOSIS — R26.89 BALANCE PROBLEMS: ICD-10-CM

## 2025-07-30 DIAGNOSIS — R29.898 IMPAIRED FLEXIBILITY OF LOWER EXTREMITY: ICD-10-CM

## 2025-07-30 DIAGNOSIS — R29.898 DECREASED STRENGTH OF TRUNK AND BACK: ICD-10-CM

## 2025-07-30 DIAGNOSIS — R29.898 DECREASED STRENGTH OF LOWER EXTREMITY: Primary | ICD-10-CM

## 2025-07-30 PROCEDURE — 97110 THERAPEUTIC EXERCISES: CPT | Mod: CQ

## 2025-07-30 PROCEDURE — 97140 MANUAL THERAPY 1/> REGIONS: CPT | Mod: CQ

## 2025-07-30 PROCEDURE — 97112 NEUROMUSCULAR REEDUCATION: CPT | Mod: CQ

## 2025-07-30 NOTE — PROGRESS NOTES
Outpatient Rehab    Physical Therapy Visit    Patient Name: Robert Lubin  MRN: [id number]  YOB: 1970  Encounter Date: 7/30/2025    Therapy Diagnosis:   Encounter Diagnoses   Name Primary?    Decreased strength of lower extremity Yes    Decreased strength of trunk and back     Impaired flexibility of lower extremity     Balance problems     Impaired proprioception      Physician: AILYN Trinidad*    Physician Orders: Eval and Treat  Medical Diagnosis: Muscle weakness (generalized)  Surgical Diagnosis: Not applicable for this Episode   Surgical Date: Not applicable for this Episode  Days Since Last Surgery: Not applicable for this Episode    Visit # / Visits Authorized:  7 / 18  Insurance Authorization Period: 7/7/2025 to 8/29/2025  Date of Evaluation: 7/3/2025  Plan of Care Certification: 7/3/2025 to 8/29/2025      PT/PTA: PTA   Number of PTA visits since last PT visit:1  Time In: 1015   Time Out: 1055  Total Time (in minutes): 40   Total Billable Time (in minutes): 40    FOTO:  Intake Score (%): 73  Survey Score 2 (%): 73  Survey Score 3 (%): Not applicable for this Episode    Precautions:         Subjective   No new issues reported today..  Pain reported as 0/10.      Objective            Treatment:  Therapeutic Exercise  TE 1: sit to stand with medicine ball for LE and trunk strengthening  TE 2: split stance pall of press with rotation and march with green TB resistance for balance and core strength  TE 4: B LE strengthening in supine and side-lying  Manual Therapy  MT 1: (B) HS, piri, and hip add stretch  MT 2: hip IR/ER PROM 90/90  MT 3: prone quad and HF stretch  Balance/Neuromuscular Re-Education  NMR 3: Glute re-ed with bridges with chest press (10#)  NMR 4: Core re-ed with abdominal press using stability ball    Time Entry(in minutes):  Manual Therapy Time Entry: 10  Neuromuscular Re-Education Time Entry: 15  Therapeutic Exercise Time Entry: 15    Assessment & Plan   Assessment:  Patient with good performance today with PT interventions. He exhibited good form and proper execution with strengthening exercises. Mild muscle fatigue noted which is an expected response. Will continue to progress patient as he is able. Recommend he continue with PT plan of care to address deficits.   Evaluation/Treatment Tolerance: Patient tolerated treatment well    The patient will continue to benefit from skilled outpatient physical therapy in order to address the deficits listed in the problem list on the initial evaluation, provide patient and family education, and maximize the patients level of independence in the home and community environments.     The patient's spiritual, cultural, and educational needs were considered, and the patient is agreeable to the plan of care and goals.     Education  Education was done with Patient. The patient's learning style includes Listening. The patient Verbalizes understanding.         Discussed fall prevention and safety        Plan: Ct with current PT POC.    Goals:   Active       Flexibility        patient will improve bilateral hamstring flexibility to 75% of normal  (Progressing)       Start:  07/03/25    Expected End:  08/29/25               Functional outcome       Patient will show a significant change in FOTO > 5 points  patient-reported outcome tool to demonstrate subjective improvement (Progressing)       Start:  07/03/25    Expected End:  08/29/25            Patient stated goal:   I want to be able to return to the gym and maintain lower extremity strength  (Progressing)       Start:  07/03/25    Expected End:  08/29/25            Patient will demonstrate independence in home program for support of progression (Progressing)       Start:  07/03/25    Expected End:  08/29/25               Strength       Patient will demonstrate 4/5 abdominal strength (Progressing)       Start:  07/03/25    Expected End:  08/29/25            Patient will demonstrate 4/5 spinal  strength (Progressing)       Start:  07/03/25    Expected End:  08/29/25            Patient will achieve right hip flexion strength of 3+/5 (Progressing)       Start:  07/03/25    Expected End:  08/29/25            Patient will achieve right hip extension strength of 4+/5 (Progressing)       Start:  07/03/25    Expected End:  08/29/25            Patient will achieve right hip abduction strength of 3+/5 (Progressing)       Start:  07/03/25    Expected End:  08/29/25            Patient will achieve right hip adduction strength of 4+/5 (Progressing)       Start:  07/03/25    Expected End:  08/29/25            Patient will achieve right hip external rotation strength of 4/5 in 90 degrees hip flexion (Progressing)       Start:  07/03/25    Expected End:  08/29/25            Patient will achieve right hip internal rotation strength of 4/5 in 90 degrees hip flexion (Progressing)       Start:  07/03/25    Expected End:  08/29/25            Patient will achieve right knee flexion strength of 4/5 (Progressing)       Start:  07/03/25    Expected End:  08/29/25            Patient will achieve right knee extension strength of 4+/5 (Progressing)       Start:  07/03/25    Expected End:  08/29/25            Patient will achieve right ankle dorsiflexion strength of 3+/5 (Progressing)       Start:  07/03/25    Expected End:  08/29/25            Patient will achieve right ankle plantar flexion strength of 3/5 (Progressing)       Start:  07/03/25    Expected End:  08/29/25                Kalani Posey PTA

## 2025-08-01 ENCOUNTER — CLINICAL SUPPORT (OUTPATIENT)
Dept: REHABILITATION | Facility: HOSPITAL | Age: 55
End: 2025-08-01
Payer: COMMERCIAL

## 2025-08-01 DIAGNOSIS — R29.818 IMPAIRED PROPRIOCEPTION: ICD-10-CM

## 2025-08-01 DIAGNOSIS — R29.898 DECREASED STRENGTH OF TRUNK AND BACK: ICD-10-CM

## 2025-08-01 DIAGNOSIS — R29.898 IMPAIRED FLEXIBILITY OF LOWER EXTREMITY: ICD-10-CM

## 2025-08-01 DIAGNOSIS — R29.898 DECREASED STRENGTH OF LOWER EXTREMITY: Primary | ICD-10-CM

## 2025-08-01 DIAGNOSIS — R26.89 BALANCE PROBLEMS: ICD-10-CM

## 2025-08-01 PROCEDURE — 97110 THERAPEUTIC EXERCISES: CPT | Mod: CQ

## 2025-08-01 PROCEDURE — 97112 NEUROMUSCULAR REEDUCATION: CPT | Mod: CQ

## 2025-08-01 NOTE — PROGRESS NOTES
Outpatient Rehab    Physical Therapy Visit    Patient Name: Robert Lubin  MRN: [id number]  YOB: 1970  Encounter Date: 8/1/2025    Therapy Diagnosis:   Encounter Diagnoses   Name Primary?    Decreased strength of lower extremity Yes    Decreased strength of trunk and back     Impaired flexibility of lower extremity     Balance problems     Impaired proprioception      Physician: AILYN Trinidad*    Physician Orders: Eval and Treat  Medical Diagnosis: Muscle weakness (generalized)  Surgical Diagnosis: Not applicable for this Episode   Surgical Date: Not applicable for this Episode  Days Since Last Surgery: Not applicable for this Episode    Visit # / Visits Authorized:  8 / 18  Insurance Authorization Period: 7/7/2025 to 8/29/2025  Date of Evaluation: 7/3/2025  Plan of Care Certification: 7/3/2025 to 8/29/2025      PT/PTA: PTA   Number of PTA visits since last PT visit:2  Time In: 1100   Time Out: 1145  Total Time (in minutes): 45   Total Billable Time (in minutes): 45    FOTO:  Intake Score (%): 73  Survey Score 2 (%): 73  Survey Score 3 (%): Not applicable for this Episode    Precautions:         Subjective   Patient states he has been experiencing some involuntary flexion of the R great toe in the evenings. He performs stretching when it ocurrs and it helps to diminish, but rest makes it go away..  Pain reported as 0/10. R Hip    Objective            Treatment:  Therapeutic Exercise  TE 4: B LE strengthening in supine and side-lying  TE 5: NuStep warm-up  Balance/Neuromuscular Re-Education  NMR 2: HS re-ed with prone HS curls (5#)  NMR 3: Glute re-ed with bridges  NMR 4: Core re-ed with abdominal press using stability ball  NMR 5: Quad re-ed with SAQ    Time Entry(in minutes):  Neuromuscular Re-Education Time Entry: 25  Therapeutic Exercise Time Entry: 20    Assessment & Plan   Assessment:  demonstrated a positive response after session today as evidenced by ability to complete all  exercises with good effort and no report of increased pain. Performed lower extremity and core strengthening and re-education to address goals of strengthening and functional mobility, and NuStep to improve conditioning with good tolerance.  Evaluation/Treatment Tolerance: Patient tolerated treatment well    The patient will continue to benefit from skilled outpatient physical therapy in order to address the deficits listed in the problem list on the initial evaluation, provide patient and family education, and maximize the patients level of independence in the home and community environments.     The patient's spiritual, cultural, and educational needs were considered, and the patient is agreeable to the plan of care and goals.           Plan: Ct with current PT POC.    Goals:   Active       Flexibility        patient will improve bilateral hamstring flexibility to 75% of normal  (Progressing)       Start:  07/03/25    Expected End:  08/29/25               Functional outcome       Patient will show a significant change in FOTO > 5 points  patient-reported outcome tool to demonstrate subjective improvement (Progressing)       Start:  07/03/25    Expected End:  08/29/25            Patient stated goal:   I want to be able to return to the gym and maintain lower extremity strength  (Progressing)       Start:  07/03/25    Expected End:  08/29/25            Patient will demonstrate independence in home program for support of progression (Progressing)       Start:  07/03/25    Expected End:  08/29/25               Strength       Patient will demonstrate 4/5 abdominal strength (Progressing)       Start:  07/03/25    Expected End:  08/29/25            Patient will demonstrate 4/5 spinal strength (Progressing)       Start:  07/03/25    Expected End:  08/29/25            Patient will achieve right hip flexion strength of 3+/5 (Progressing)       Start:  07/03/25    Expected End:  08/29/25            Patient will achieve  right hip extension strength of 4+/5 (Progressing)       Start:  07/03/25    Expected End:  08/29/25            Patient will achieve right hip abduction strength of 3+/5 (Progressing)       Start:  07/03/25    Expected End:  08/29/25            Patient will achieve right hip adduction strength of 4+/5 (Progressing)       Start:  07/03/25    Expected End:  08/29/25            Patient will achieve right hip external rotation strength of 4/5 in 90 degrees hip flexion (Progressing)       Start:  07/03/25    Expected End:  08/29/25            Patient will achieve right hip internal rotation strength of 4/5 in 90 degrees hip flexion (Progressing)       Start:  07/03/25    Expected End:  08/29/25            Patient will achieve right knee flexion strength of 4/5 (Progressing)       Start:  07/03/25    Expected End:  08/29/25            Patient will achieve right knee extension strength of 4+/5 (Progressing)       Start:  07/03/25    Expected End:  08/29/25            Patient will achieve right ankle dorsiflexion strength of 3+/5 (Progressing)       Start:  07/03/25    Expected End:  08/29/25            Patient will achieve right ankle plantar flexion strength of 3/5 (Progressing)       Start:  07/03/25    Expected End:  08/29/25                Abdirahman Mccoy, PTA

## 2025-08-04 ENCOUNTER — CLINICAL SUPPORT (OUTPATIENT)
Dept: REHABILITATION | Facility: HOSPITAL | Age: 55
End: 2025-08-04
Payer: COMMERCIAL

## 2025-08-04 DIAGNOSIS — R29.898 IMPAIRED FLEXIBILITY OF LOWER EXTREMITY: ICD-10-CM

## 2025-08-04 DIAGNOSIS — R26.89 BALANCE PROBLEMS: ICD-10-CM

## 2025-08-04 DIAGNOSIS — R29.898 DECREASED STRENGTH OF TRUNK AND BACK: ICD-10-CM

## 2025-08-04 DIAGNOSIS — R29.898 DECREASED STRENGTH OF LOWER EXTREMITY: Primary | ICD-10-CM

## 2025-08-04 DIAGNOSIS — R29.818 IMPAIRED PROPRIOCEPTION: ICD-10-CM

## 2025-08-04 PROCEDURE — 97530 THERAPEUTIC ACTIVITIES: CPT

## 2025-08-04 PROCEDURE — 97112 NEUROMUSCULAR REEDUCATION: CPT

## 2025-08-04 PROCEDURE — 97110 THERAPEUTIC EXERCISES: CPT

## 2025-08-04 NOTE — PROGRESS NOTES
Outpatient Rehab    Physical Therapy Progress Note    Patient Name: Robert Lubin  MRN: [id number]  YOB: 1970  Encounter Date: 8/4/2025    Therapy Diagnosis:   Encounter Diagnoses   Name Primary?    Decreased strength of lower extremity Yes    Decreased strength of trunk and back     Impaired flexibility of lower extremity     Balance problems     Impaired proprioception      Physician: AILYN Trinidad*    Physician Orders: Eval and Treat  Medical Diagnosis: Muscle weakness (generalized)  Surgical Diagnosis: Not applicable for this Episode   Surgical Date: Not applicable for this Episode  Days Since Last Surgery: Not applicable for this Episode    Visit # / Visits Authorized:  9 / 18  Insurance Authorization Period: 7/7/2025 to 8/29/2025  Date of Evaluation: 7/3/2025  Plan of Care Certification: 7/3/2025 to 8/29/2025   Progress Note Date Range: 07/03/2025 to 8/4/2025     PT/PTA: PT   Number of PTA visits since last PT visit:0  Time In: 1103   Time Out: 1147  Total Time (in minutes): 44   Total Billable Time (in minutes):      FOTO:  Intake Score (%): 73  Survey Score 2 (%): 73  Survey Score 3 (%): 73    Precautions:       Subjective    returned this weekend from [place]. States that all tests shows his current state as stable. Patient walked up 4 flights of stairs without difficulty while there. Leaving tomorrow for vacation the rest of this week..  Pain reported as 3/10. Right hip    Objective            Hip Strength - Planes of Motion   Right Strength Right Pain Left Strength Left  Pain   Flexion (L2) 3+         Extension 3+         ABduction 4-         ADduction 2+         Internal Rotation 3+         External Rotation 3+             Knee Strength   Right Strength Right Pain Left Strength Left  Pain   Flexion (S2) 3-         Prone Flexion           Extension (L3) 4+                Ankle/Foot Strength - Planes of Motion   Right Strength Right Pain Left Strength Left  Pain    Dorsiflexion (L4) 2+         Plantar Flexion (S1) 2-         Inversion           Eversion           Great Toe Flexion           Great Toe Extension (L5)           Lesser Toes Flexion           Lesser Toes Extension                  Vestibular Positional and Balance Testing       Modified Clinical Test of Sensory Interaction and Balance (CTSIB-M): 30/30/24/0    2/4              Four Stage Balance Test  Narrow Base of Support: 30 sec sec              Single Leg Stand - Right Foot: 0 sec  Single Leg Stand - Left Foot: 30 sec       Stairs Assistance Required   Assistance Level Upper Extremity Support Pattern   Ascending Independent Two rails Reciprocal   Descending Independent Two rails Reciprocal        Ambulation Details  Ambulation distance was 365 meters. Patient demonstrates slight drop foot on Right     Gait Analysis  Gait Pattern: Ataxic  Walking Speed: Increased    Right Foot Contact Pattern: Heel to toe       Ankle/Foot Observations During Gait  Right: Foot Drop         Treatment:  Balance/Neuromuscular Re-Education  NMR 6: Single leg stance  NMR 7: MCTSIB  Therapeutic Activity  TA 4: Six Minute Walk test    Time Entry(in minutes):  Neuromuscular Re-Education Time Entry: 14  Therapeutic Activity Time Entry: 10  Therapeutic Exercise Time Entry: 20    Assessment & Plan   Assessment:  demonstrated a (+) response to today's treatment as evidenced by completing 6 minute walk test with no problems. Patient reported mild fatigue and no rest breaks during testing. Noted mild drop foot increase with increased walking distance. The patient is making Progress toward goals: expected  and good  progress towards goals as evidenced by improved strength in right hip and lower leg. However, the patient continues to demonstrate deficits with decreased balance, endurance, and limited strength Right lower extremity.   Evaluation/Treatment Tolerance: Patient tolerated treatment well    The patient will continue to benefit  from skilled outpatient physical therapy in order to address the deficits listed in the problem list on the initial evaluation, provide patient and family education, and maximize the patients level of independence in the home and community environments.     The patient's spiritual, cultural, and educational needs were considered, and the patient is agreeable to the plan of care and goals.     Education  Education was done with Patient. The patient's learning style includes Demonstration and Listening. The patient Demonstrates understanding and Verbalizes understanding.         Reviewed home exercises and encouraged patient to add more balance work to daily exercises and function.        Plan:      Goals:   Active       Flexibility        patient will improve bilateral hamstring flexibility to 75% of normal  (Progressing)       Start:  07/03/25    Expected End:  08/29/25               Functional outcome       Patient will show a significant change in FOTO > 5 points  patient-reported outcome tool to demonstrate subjective improvement (Progressing)       Start:  07/03/25    Expected End:  08/29/25            Patient stated goal:   I want to be able to return to the gym and maintain lower extremity strength  (Progressing)       Start:  07/03/25    Expected End:  08/29/25            Patient will demonstrate independence in home program for support of progression (Met)       Start:  07/03/25    Expected End:  08/29/25    Resolved:  08/04/25            Strength       Patient will demonstrate 4/5 abdominal strength (Progressing)       Start:  07/03/25    Expected End:  08/29/25            Patient will demonstrate 4/5 spinal strength (Progressing)       Start:  07/03/25    Expected End:  08/29/25            Patient will achieve right hip flexion strength of 3+/5 (Met)       Start:  07/03/25    Expected End:  08/29/25    Resolved:  08/04/25         Patient will achieve right hip extension strength of 4+/5 (Progressing)        Start:  07/03/25    Expected End:  08/29/25            Patient will achieve right hip abduction strength of 3+/5 (Met)       Start:  07/03/25    Expected End:  08/29/25    Resolved:  08/04/25         Patient will achieve right hip adduction strength of 4+/5 (Progressing)       Start:  07/03/25    Expected End:  08/29/25            Patient will achieve right hip external rotation strength of 4/5 in 90 degrees hip flexion (Progressing)       Start:  07/03/25    Expected End:  08/29/25            Patient will achieve right hip internal rotation strength of 4/5 in 90 degrees hip flexion (Progressing)       Start:  07/03/25    Expected End:  08/29/25            Patient will achieve right knee flexion strength of 4/5 (Progressing)       Start:  07/03/25    Expected End:  08/29/25            Patient will achieve right knee extension strength of 4+/5 (Met)       Start:  07/03/25    Expected End:  08/29/25    Resolved:  08/04/25         Patient will achieve right ankle dorsiflexion strength of 3+/5 (Progressing)       Start:  07/03/25    Expected End:  08/29/25            Patient will achieve right ankle plantar flexion strength of 3/5 (Progressing)       Start:  07/03/25    Expected End:  08/29/25                John Devi PT

## 2025-08-08 DIAGNOSIS — C64.2 MALIGNANT NEOPLASM OF LEFT KIDNEY, EXCEPT RENAL PELVIS: Primary | ICD-10-CM

## 2025-08-08 DIAGNOSIS — Q85.83 VON HIPPEL-LINDAU SYNDROME: ICD-10-CM

## 2025-08-08 DIAGNOSIS — I82.90 THROMBOSIS OF VEIN: ICD-10-CM

## 2025-08-13 ENCOUNTER — HOSPITAL ENCOUNTER (OUTPATIENT)
Dept: RADIOLOGY | Facility: HOSPITAL | Age: 55
Discharge: HOME | End: 2025-08-13
Payer: COMMERCIAL

## 2025-08-13 ENCOUNTER — CLINICAL SUPPORT (OUTPATIENT)
Dept: REHABILITATION | Facility: HOSPITAL | Age: 55
End: 2025-08-13
Payer: COMMERCIAL

## 2025-08-13 DIAGNOSIS — R29.898 DECREASED STRENGTH OF TRUNK AND BACK: ICD-10-CM

## 2025-08-13 DIAGNOSIS — R29.898 DECREASED STRENGTH OF LOWER EXTREMITY: Primary | ICD-10-CM

## 2025-08-13 DIAGNOSIS — R26.89 BALANCE PROBLEMS: ICD-10-CM

## 2025-08-13 DIAGNOSIS — Q85.83 VON HIPPEL-LINDAU SYNDROME: ICD-10-CM

## 2025-08-13 DIAGNOSIS — R29.898 IMPAIRED FLEXIBILITY OF LOWER EXTREMITY: ICD-10-CM

## 2025-08-13 DIAGNOSIS — R29.818 IMPAIRED PROPRIOCEPTION: ICD-10-CM

## 2025-08-13 DIAGNOSIS — I82.90 THROMBOSIS OF VEIN: ICD-10-CM

## 2025-08-13 DIAGNOSIS — C64.2 MALIGNANT NEOPLASM OF LEFT KIDNEY, EXCEPT RENAL PELVIS: ICD-10-CM

## 2025-08-13 PROCEDURE — 97112 NEUROMUSCULAR REEDUCATION: CPT | Mod: CQ

## 2025-08-13 PROCEDURE — 97110 THERAPEUTIC EXERCISES: CPT | Mod: CQ

## 2025-08-13 PROCEDURE — 97140 MANUAL THERAPY 1/> REGIONS: CPT | Mod: CQ

## 2025-08-13 PROCEDURE — 93971 EXTREMITY STUDY: CPT | Mod: TC,LT

## 2025-08-15 ENCOUNTER — CLINICAL SUPPORT (OUTPATIENT)
Dept: REHABILITATION | Facility: HOSPITAL | Age: 55
End: 2025-08-15
Payer: COMMERCIAL

## 2025-08-15 DIAGNOSIS — R26.89 BALANCE PROBLEMS: ICD-10-CM

## 2025-08-15 DIAGNOSIS — R29.898 DECREASED STRENGTH OF TRUNK AND BACK: ICD-10-CM

## 2025-08-15 DIAGNOSIS — R29.818 IMPAIRED PROPRIOCEPTION: ICD-10-CM

## 2025-08-15 DIAGNOSIS — R29.898 DECREASED STRENGTH OF LOWER EXTREMITY: Primary | ICD-10-CM

## 2025-08-15 DIAGNOSIS — R29.898 IMPAIRED FLEXIBILITY OF LOWER EXTREMITY: ICD-10-CM

## 2025-08-15 PROCEDURE — 97110 THERAPEUTIC EXERCISES: CPT | Mod: CQ

## 2025-08-15 PROCEDURE — 97112 NEUROMUSCULAR REEDUCATION: CPT | Mod: CQ

## 2025-08-18 ENCOUNTER — CLINICAL SUPPORT (OUTPATIENT)
Dept: REHABILITATION | Facility: HOSPITAL | Age: 55
End: 2025-08-18
Payer: COMMERCIAL

## 2025-08-18 DIAGNOSIS — R29.818 IMPAIRED PROPRIOCEPTION: ICD-10-CM

## 2025-08-18 DIAGNOSIS — M62.81 MUSCLE WEAKNESS (GENERALIZED): ICD-10-CM

## 2025-08-18 DIAGNOSIS — R26.89 BALANCE PROBLEMS: ICD-10-CM

## 2025-08-18 DIAGNOSIS — R29.898 DECREASED STRENGTH OF TRUNK AND BACK: ICD-10-CM

## 2025-08-18 DIAGNOSIS — R29.898 DECREASED STRENGTH OF LOWER EXTREMITY: Primary | ICD-10-CM

## 2025-08-18 DIAGNOSIS — R29.898 IMPAIRED FLEXIBILITY OF LOWER EXTREMITY: ICD-10-CM

## 2025-08-18 PROCEDURE — 97112 NEUROMUSCULAR REEDUCATION: CPT

## 2025-08-18 PROCEDURE — 97110 THERAPEUTIC EXERCISES: CPT

## 2025-08-20 ENCOUNTER — CLINICAL SUPPORT (OUTPATIENT)
Dept: REHABILITATION | Facility: HOSPITAL | Age: 55
End: 2025-08-20
Payer: COMMERCIAL

## 2025-08-20 DIAGNOSIS — R29.898 DECREASED STRENGTH OF TRUNK AND BACK: ICD-10-CM

## 2025-08-20 DIAGNOSIS — R29.898 DECREASED STRENGTH OF LOWER EXTREMITY: Primary | ICD-10-CM

## 2025-08-20 DIAGNOSIS — R29.898 IMPAIRED FLEXIBILITY OF LOWER EXTREMITY: ICD-10-CM

## 2025-08-20 DIAGNOSIS — R29.818 IMPAIRED PROPRIOCEPTION: ICD-10-CM

## 2025-08-20 DIAGNOSIS — R26.89 BALANCE PROBLEMS: ICD-10-CM

## 2025-08-20 PROCEDURE — 97112 NEUROMUSCULAR REEDUCATION: CPT | Mod: CQ

## 2025-08-20 PROCEDURE — 97110 THERAPEUTIC EXERCISES: CPT | Mod: CQ

## 2025-08-20 PROCEDURE — 97140 MANUAL THERAPY 1/> REGIONS: CPT | Mod: CQ

## 2025-08-25 ENCOUNTER — CLINICAL SUPPORT (OUTPATIENT)
Dept: REHABILITATION | Facility: HOSPITAL | Age: 55
End: 2025-08-25
Payer: COMMERCIAL

## 2025-08-25 DIAGNOSIS — R26.89 BALANCE PROBLEMS: ICD-10-CM

## 2025-08-25 DIAGNOSIS — M62.81 MUSCLE WEAKNESS (GENERALIZED): ICD-10-CM

## 2025-08-25 DIAGNOSIS — R29.818 IMPAIRED PROPRIOCEPTION: ICD-10-CM

## 2025-08-25 DIAGNOSIS — R29.898 IMPAIRED FLEXIBILITY OF LOWER EXTREMITY: ICD-10-CM

## 2025-08-25 DIAGNOSIS — R29.898 DECREASED STRENGTH OF TRUNK AND BACK: ICD-10-CM

## 2025-08-25 DIAGNOSIS — R29.898 DECREASED STRENGTH OF LOWER EXTREMITY: Primary | ICD-10-CM

## 2025-08-25 PROCEDURE — 97140 MANUAL THERAPY 1/> REGIONS: CPT

## 2025-08-25 PROCEDURE — 97110 THERAPEUTIC EXERCISES: CPT

## 2025-08-28 ENCOUNTER — CLINICAL SUPPORT (OUTPATIENT)
Dept: REHABILITATION | Facility: HOSPITAL | Age: 55
End: 2025-08-28
Payer: COMMERCIAL

## 2025-08-28 DIAGNOSIS — R29.898 IMPAIRED FLEXIBILITY OF LOWER EXTREMITY: ICD-10-CM

## 2025-08-28 DIAGNOSIS — R29.898 DECREASED STRENGTH OF LOWER EXTREMITY: Primary | ICD-10-CM

## 2025-08-28 DIAGNOSIS — R26.89 BALANCE PROBLEMS: ICD-10-CM

## 2025-08-28 DIAGNOSIS — R29.818 IMPAIRED PROPRIOCEPTION: ICD-10-CM

## 2025-08-28 DIAGNOSIS — R29.898 DECREASED STRENGTH OF TRUNK AND BACK: ICD-10-CM

## 2025-08-28 PROCEDURE — 97110 THERAPEUTIC EXERCISES: CPT | Mod: CQ

## 2025-09-05 ENCOUNTER — CLINICAL SUPPORT (OUTPATIENT)
Dept: REHABILITATION | Facility: HOSPITAL | Age: 55
End: 2025-09-05
Payer: COMMERCIAL

## 2025-09-05 DIAGNOSIS — R29.898 DECREASED STRENGTH OF LOWER EXTREMITY: Primary | ICD-10-CM

## 2025-09-05 DIAGNOSIS — R29.898 IMPAIRED FLEXIBILITY OF LOWER EXTREMITY: ICD-10-CM

## 2025-09-05 DIAGNOSIS — R29.898 DECREASED STRENGTH OF TRUNK AND BACK: ICD-10-CM

## 2025-09-05 DIAGNOSIS — R29.818 IMPAIRED PROPRIOCEPTION: ICD-10-CM

## 2025-09-05 DIAGNOSIS — R26.89 BALANCE PROBLEMS: ICD-10-CM

## 2025-09-05 PROCEDURE — 97110 THERAPEUTIC EXERCISES: CPT | Mod: CQ

## 2025-09-05 PROCEDURE — 97112 NEUROMUSCULAR REEDUCATION: CPT | Mod: CQ

## (undated) DEVICE — TUBING HPCIL ROT M/F LL 10IN

## (undated) DEVICE — KIT GLIDESHEATH SLEND 6FR 10CM

## (undated) DEVICE — CATH ANGIO IMPULSE FR3.5 5X100

## (undated) DEVICE — GUIDEWIRE SION BLUE STR 190CM

## (undated) DEVICE — VALVE HMSTS GRDN II 8FR GW INS

## (undated) DEVICE — CATH IMPULSE PIGTAIL 5FR 110CM

## (undated) DEVICE — GUIDE LAUNCHER 6FR JR 4.0

## (undated) DEVICE — GUIDEWIRE INQWIRE .021IN 180CM

## (undated) DEVICE — CANNULA NASAL CO2 O2 ADULT

## (undated) DEVICE — SET EXT NAMIC ARTERIAL 12IN

## (undated) DEVICE — PAD DEFIB CADENCE ADULT R2

## (undated) DEVICE — CATH EAGLE EYE PLATINUM

## (undated) DEVICE — SHEATH INTRODUCER 6FR 11CM

## (undated) DEVICE — GUIDEWIRE VASC 3X190CM .014IN

## (undated) DEVICE — SET ANGIO ACIST CVI ANGIOTOUCH

## (undated) DEVICE — CATH IMPULSE FL4 5FR 100CM

## (undated) DEVICE — Device

## (undated) DEVICE — GUIDE LAUNCHER 6FR EBU 3.5

## (undated) DEVICE — CATH OPTITORQUE RADIAL 5FR

## (undated) DEVICE — BAND TR WITH INFLATOR

## (undated) DEVICE — SET MICROPUNCTUREECHO STIFF